# Patient Record
Sex: FEMALE | Race: WHITE | NOT HISPANIC OR LATINO | Employment: OTHER | URBAN - NONMETROPOLITAN AREA
[De-identification: names, ages, dates, MRNs, and addresses within clinical notes are randomized per-mention and may not be internally consistent; named-entity substitution may affect disease eponyms.]

---

## 2017-01-05 ENCOUNTER — CLINICAL SUPPORT (OUTPATIENT)
Dept: CARDIOLOGY | Facility: CLINIC | Age: 82
End: 2017-01-05

## 2017-01-05 VITALS
OXYGEN SATURATION: 95 % | SYSTOLIC BLOOD PRESSURE: 179 MMHG | DIASTOLIC BLOOD PRESSURE: 71 MMHG | HEIGHT: 64 IN | BODY MASS INDEX: 24.11 KG/M2 | HEART RATE: 68 BPM | WEIGHT: 141.2 LBS

## 2017-01-05 DIAGNOSIS — I10 ESSENTIAL HYPERTENSION: Primary | ICD-10-CM

## 2017-01-05 DIAGNOSIS — I25.10 CORONARY ARTERY DISEASE DUE TO CALCIFIED CORONARY LESION: ICD-10-CM

## 2017-01-05 DIAGNOSIS — I25.84 CORONARY ARTERY DISEASE DUE TO CALCIFIED CORONARY LESION: ICD-10-CM

## 2017-01-05 PROCEDURE — 99211 OFF/OP EST MAY X REQ PHY/QHP: CPT | Performed by: NURSE PRACTITIONER

## 2017-01-05 RX ORDER — LOSARTAN POTASSIUM 100 MG/1
100 TABLET ORAL DAILY
Qty: 30 TABLET | Refills: 5 | Status: SHIPPED | OUTPATIENT
Start: 2017-01-05 | End: 2017-02-02

## 2017-01-05 NOTE — PROGRESS NOTES
Cici Winter  8/18/1925 1/5/2017   ?   Chief Complaint   Patient presents with   • Follow-up     htn      ?   HPI:   ?   ? Patient here for b/p check / HTN follow-up. B/P readings brought to office for last 2 weeks. States had tried taking the clonidine tid but she stopped taking it because it didn't help at all. Denies any chest pain, shortness of breath etc. She stated she had a bowel movement and the Nifedipine tablet she takes came out in her stool un-dissolved, so she was concerns about this. Will discuss above with Pattie Hess NP and  Proceed accordingly. PH,LPN  ?     Current Outpatient Prescriptions:   •  aspirin 81 MG tablet, Take  by mouth daily., Disp: , Rfl:   •  carvedilol (COREG) 6.25 MG tablet, Take  by mouth 2 (two) times a day., Disp: , Rfl:   •  CloNIDine (CATAPRES) 0.1 MG tablet, Take 1 tablet by mouth 3 (Three) Times a Day As Needed for high blood pressure (SBP > 160 or DBP . 90)., Disp: 40 tablet, Rfl: 3  •  dipyridamole (PERSANTINE) 75 MG tablet, Take  by mouth., Disp: , Rfl:   •  furosemide (LASIX) 40 MG tablet, Take 40 mg by mouth Daily As Needed (edema)., Disp: , Rfl:   •  isosorbide dinitrate (ISORDIL) 10 MG tablet, Take  by mouth every 8 (eight) hours., Disp: , Rfl:   •  KRILL OIL PO, Take  by mouth daily., Disp: , Rfl:   •  methimazole (TAPAZOLE) 5 MG tablet, Take 0.5 tablets by mouth daily., Disp: , Rfl:   •  NIFEdipine CC (ADALAT CC) 60 MG 24 hr tablet, Take  by mouth daily., Disp: , Rfl:   •  nitroglycerin (NITROSTAT) 0.4 MG SL tablet, Place  under the tongue., Disp: , Rfl:   •  potassium chloride (MICRO-K) 10 MEQ CR capsule, Take 10 mEq by mouth Daily As Needed (with lasix)., Disp: , Rfl:   •  pravastatin (PRAVACHOL) 40 MG tablet, Take  by mouth every other day., Disp: , Rfl:   •  terazosin (HYTRIN) 2 MG capsule, TAKE 1 CAPSULE DAILY, Disp: 30 capsule, Rfl: 5  •  VITAMIN B1-B12 IJ, Inject  as directed. Monthly, Disp: , Rfl:   •  vitamin D (ERGOCALCIFEROL) 06442 UNITS capsule  capsule, Take  by mouth 1 (one) time per week., Disp: , Rfl:   •  losartan (COZAAR) 100 MG tablet, Take 1 tablet by mouth Daily., Disp: 30 tablet, Rfl: 5    Current Facility-Administered Medications:   •  CloNIDine (CATAPRES) tablet 0.1 mg, 0.1 mg, Oral, Once, DEBBY Woody   ?   ?   Promethazine       Procedures     ?   Assessment/Plan    ? Patient will increase cozaar to 100 mg.  She will get a BMP in 1 week.  She will call next week with her BP readings.  She will get an appt at next avail for follow-up.    ?   ?

## 2017-01-05 NOTE — MR AVS SNAPSHOT
Cici Winter   1/5/2017 9:00 AM   Clinical Support    Dept Phone:  [phone]   Encounter #:  [id number]    Provider:  NURSE CARD SMRST MONET   Department:  [facility] CARDIOLOGY                Your Full Care Plan              Today's Medication Changes          These changes are accurate as of: 1/5/17  9:20 AM.  If you have any questions, ask your nurse or doctor.               Medication(s)that have changed:     losartan 100 MG tablet   Commonly known as:  COZAAR   Take 1 tablet by mouth Daily.   What changed:    - medication strength  - how much to take            Where to Get Your Medications      These medications were sent to MEDICINE [facility] - [place], [place] - [address] - [phone]  - [phone]   [address], [facility] [place] [address]     Phone:  [phone]     losartan 100 MG tablet                  Your Updated Medication List          This list is accurate as of: 1/5/17  9:20 AM.  Always use your most recent med list.                aspirin 81 MG tablet       carvedilol 6.25 MG tablet   Commonly known as:  COREG       CloNIDine 0.1 MG tablet   Commonly known as:  CATAPRES   Take 1 tablet by mouth 3 (Three) Times a Day As Needed for high blood pressure (SBP > 160 or DBP . 90).       furosemide 40 MG tablet   Commonly known as:  LASIX       isosorbide dinitrate 10 MG tablet   Commonly known as:  ISORDIL       KRILL OIL PO       losartan 100 MG tablet   Commonly known as:  COZAAR   Take 1 tablet by mouth Daily.       methIMAzole 5 MG tablet   Commonly known as:  TAPAZOLE       NIFEdipine CC 60 MG 24 hr tablet   Commonly known as:  ADALAT CC       NITROSTAT 0.4 MG SL tablet   Generic drug:  nitroglycerin       PERSANTINE 75 MG tablet   Generic drug:  dipyridamole       potassium chloride 10 MEQ CR capsule   Commonly known as:  MICRO-K       pravastatin 40 MG tablet   Commonly known as:  PRAVACHOL       terazosin 2 MG  "capsule   Commonly known as:  HYTRIN   TAKE 1 CAPSULE DAILY       VITAMIN B1-B12 IJ       vitamin D 84440 UNITS capsule capsule   Commonly known as:  ERGOCALCIFEROL               You Were Diagnosed With        Codes Comments    Essential hypertension    -  Primary ICD-10-CM: I10  ICD-9-CM: 401.9     Coronary artery disease due to calcified coronary lesion     ICD-10-CM: I25.10, I25.84  ICD-9-CM: 414.00, 414.4       Medications to be Given to You by a Medical Professional     Due       Frequency    12/22/2016 CloNIDine (CATAPRES) tablet 0.1 mg  Once      Instructions     None    Patient Instructions History      Upcoming Appointments     Visit Type Date Time Department    NURSE/MA VISIT 1/5/2017  9:00 AM [facility]ALY      [employer] Signup     Our records indicate that you have declined [employer] signup. If you would like to sign up for [employer], please email [email or web address] or call [phone] to obtain an activation code.             Other Info from Your Visit           Allergies     Promethazine        Reason for Visit     Follow-up htn      Vital Signs     Blood Pressure Pulse Height Weight Oxygen Saturation Body Mass Index    179/71 (BP Location: Left arm, Patient Position: Sitting) 68 64\" (162.6 cm) 141 lb 3.2 oz (64 kg) 95% 24.24 kg/m2    Smoking Status                   Former Smoker           Problems and Diagnoses Noted     High blood pressure    Coronary artery disease due to calcified coronary lesion            "

## 2017-01-10 ENCOUNTER — TELEPHONE (OUTPATIENT)
Dept: CARDIOLOGY | Facility: CLINIC | Age: 82
End: 2017-01-10

## 2017-01-10 NOTE — TELEPHONE ENCOUNTER
----- Message from Luci Tracy sent at 1/10/2017  1:01 PM EST -----  Pt calling to report BP readings:    180/60 Friday  180/60 Saturday  180/64 Sunday  169/60 Monday  160/56 Tuesday

## 2017-01-10 NOTE — TELEPHONE ENCOUNTER
B/P readings noted per Pattie Hess Np and verbal orders received to give Losartan another week and if b/p's still running 160's systolic to call our office back. Patient aware, verbalized ok. PH,LPN

## 2017-01-16 ENCOUNTER — TELEPHONE (OUTPATIENT)
Dept: CARDIOLOGY | Facility: CLINIC | Age: 82
End: 2017-01-16

## 2017-01-16 NOTE — TELEPHONE ENCOUNTER
----- Message from Luci Rossana sent at 1/16/2017  9:03 AM EST -----  Pt calling regarding her bp.    It's 197/64 currently and has been up since she last called.     Patient requests to be seen for evaluation of BP. She will be here in AM for Nurse Visit for BP Check.

## 2017-01-17 ENCOUNTER — CLINICAL SUPPORT (OUTPATIENT)
Dept: CARDIOLOGY | Facility: CLINIC | Age: 82
End: 2017-01-17

## 2017-01-17 VITALS
HEART RATE: 65 BPM | DIASTOLIC BLOOD PRESSURE: 60 MMHG | HEIGHT: 64 IN | WEIGHT: 137 LBS | BODY MASS INDEX: 23.39 KG/M2 | OXYGEN SATURATION: 96 % | SYSTOLIC BLOOD PRESSURE: 116 MMHG

## 2017-01-17 DIAGNOSIS — I10 ESSENTIAL HYPERTENSION: Primary | ICD-10-CM

## 2017-01-17 PROCEDURE — 99211 OFF/OP EST MAY X REQ PHY/QHP: CPT | Performed by: NURSE PRACTITIONER

## 2017-01-17 RX ORDER — CARVEDILOL 6.25 MG/1
TABLET ORAL
Qty: 45 TABLET | Refills: 5 | Status: SHIPPED | OUTPATIENT
Start: 2017-01-17 | End: 2017-10-10 | Stop reason: SDUPTHER

## 2017-01-17 NOTE — PROGRESS NOTES
Cici Winter  8/18/1925 1/17/2017   ?   Chief Complaint   Patient presents with   • Hypertension     nurse visit      ?   HPI:   ?   ? Patient here for HTN and b/p check. States was seen at [facility] on Friday for HTN and coreg 6.25 mg was increased to tid dosing. B/p's brought from home have still been running 160-190 systolic before meds, has used prn clonidine almost once daily, but unsure of what h/r has been running has not documented them. Patient has not been taking b/p after meds taken. Patient was at [employer] on Sunday and when she got up stated felt like blood rushed up to her head and she had to sit down and rest her head and it got better. f Has had a couple of 130 systolic readings also. Will discuss above with Pattie Hess NP.  ?     Current Outpatient Prescriptions:   •  aspirin 81 MG tablet, Take  by mouth daily., Disp: , Rfl:   •  carvedilol (COREG) 6.25 MG tablet, Take  by mouth 3 (Three) Times a Day., Disp: , Rfl:   •  CloNIDine (CATAPRES) 0.1 MG tablet, Take 1 tablet by mouth 3 (Three) Times a Day As Needed for high blood pressure (SBP > 160 or DBP . 90)., Disp: 40 tablet, Rfl: 3  •  dipyridamole (PERSANTINE) 75 MG tablet, Take  by mouth., Disp: , Rfl:   •  furosemide (LASIX) 40 MG tablet, Take 40 mg by mouth Daily As Needed (edema)., Disp: , Rfl:   •  isosorbide dinitrate (ISORDIL) 10 MG tablet, Take  by mouth every 8 (eight) hours., Disp: , Rfl:   •  KRILL OIL PO, Take  by mouth daily., Disp: , Rfl:   •  losartan (COZAAR) 100 MG tablet, Take 1 tablet by mouth Daily., Disp: 30 tablet, Rfl: 5  •  methimazole (TAPAZOLE) 5 MG tablet, Take 0.5 tablets by mouth daily., Disp: , Rfl:   •  NIFEdipine CC (ADALAT CC) 60 MG 24 hr tablet, Take  by mouth daily., Disp: , Rfl:   •  potassium chloride (MICRO-K) 10 MEQ CR capsule, Take 10 mEq by mouth Daily As Needed (with lasix)., Disp: , Rfl:   •  pravastatin (PRAVACHOL) 40 MG tablet, Take  by mouth every other day., Disp: , Rfl:   •  terazosin (HYTRIN) 2 MG  capsule, TAKE 1 CAPSULE DAILY, Disp: 30 capsule, Rfl: 5  •  VITAMIN B1-B12 IJ, Inject  as directed. Monthly, Disp: , Rfl:   •  vitamin D (ERGOCALCIFEROL) 23073 UNITS capsule capsule, Take  by mouth 1 (one) time per week., Disp: , Rfl:   •  nitroglycerin (NITROSTAT) 0.4 MG SL tablet, Place  under the tongue., Disp: , Rfl:     Current Facility-Administered Medications:   •  CloNIDine (CATAPRES) tablet 0.1 mg, 0.1 mg, Oral, Once, DEBBY Woody   ?   ?   Promethazine       Procedures     ?   Assessment/Plan    ?   ?   ? 1. HTN    Verbal orders per Pattie Hess NP to change coreg to 6.25 mg in am and 12.5 mg in pm, monitor b/p and h/r before meds taken and after meds taken, call office in a week with readings or sooner if needed. Patient and female  aware of above and verbalized they understood. PH,LPN

## 2017-01-17 NOTE — MR AVS SNAPSHOT
Cici Winter   1/17/2017 9:30 AM   Clinical Support    Dept Phone:  [phone]   Encounter #:  [id number]    Provider:  NURSE CARD SMRST MONET   Department:  [facility] CARDIOLOGY                Your Full Care Plan              Today's Medication Changes          These changes are accurate as of: 1/17/17 10:06 AM.  If you have any questions, ask your nurse or doctor.               Medication(s)that have changed:     carvedilol 6.25 MG tablet   Commonly known as:  COREG   TAKE 6.25 MG in PM and 12.5 mg in PM.   What changed:    - how to take this  - when to take this  - additional instructions            Where to Get Your Medications      These medications were sent to [facility] [place], [place] - [address] - [phone]  - [phone] FX  [address], [place] [address]     Phone:  [phone]     carvedilol 6.25 MG tablet                  Your Updated Medication List          This list is accurate as of: 1/17/17 10:06 AM.  Always use your most recent med list.                aspirin 81 MG tablet       carvedilol 6.25 MG tablet   Commonly known as:  COREG   TAKE 6.25 MG in PM and 12.5 mg in PM.       CloNIDine 0.1 MG tablet   Commonly known as:  CATAPRES   Take 1 tablet by mouth 3 (Three) Times a Day As Needed for high blood pressure (SBP > 160 or DBP . 90).       furosemide 40 MG tablet   Commonly known as:  LASIX       isosorbide dinitrate 10 MG tablet   Commonly known as:  ISORDIL       KRILL OIL PO       losartan 100 MG tablet   Commonly known as:  COZAAR   Take 1 tablet by mouth Daily.       methIMAzole 5 MG tablet   Commonly known as:  TAPAZOLE       NIFEdipine CC 60 MG 24 hr tablet   Commonly known as:  ADALAT CC       NITROSTAT 0.4 MG SL tablet   Generic drug:  nitroglycerin       PERSANTINE 75 MG tablet   Generic drug:  dipyridamole       potassium chloride 10 MEQ CR capsule   Commonly known as:  MICRO-K    "   pravastatin 40 MG tablet   Commonly known as:  PRAVACHOL       terazosin 2 MG capsule   Commonly known as:  HYTRIN   TAKE 1 CAPSULE DAILY       VITAMIN B1-B12 IJ       vitamin D 94425 UNITS capsule capsule   Commonly known as:  ERGOCALCIFEROL               You Were Diagnosed With        Codes Comments    Essential hypertension    -  Primary ICD-10-CM: I10  ICD-9-CM: 401.9       Medications to be Given to You by a Medical Professional     Due       Frequency    12/22/2016 CloNIDine (CATAPRES) tablet 0.1 mg  Once      Instructions     None    Patient Instructions History      Upcoming Appointments     Visit Type Date Time Department    NURSE/MA VISIT 1/17/2017  9:30 AM [facility]    OFFICE VISIT 1/31/2017  8:45 AM [facility] HEALY      MyChart Signup     Our records indicate that you have declined [facility] signup. If you would like to sign up for [employer], please email [email or web address] or call [phone] to obtain an activation code.             Other Info from Your Visit           Your Appointments     Jan 31, 2017  8:45 AM EST   Office Visit with DEBBY Woody   [facility] (--)    [address] [place] [id number] [phone]           Arrive 15 minutes prior to appointment.              Allergies     Promethazine        Reason for Visit     Hypertension nurse visit      Vital Signs     Blood Pressure Pulse Height Weight Oxygen Saturation Body Mass Index    116/60 (BP Location: Left arm, Patient Position: Sitting) 65 64\" (162.6 cm) 137 lb (62.1 kg) 96% 23.52 kg/m2    Smoking Status                   Former Smoker           Problems and Diagnoses Noted     High blood pressure        "

## 2017-01-23 RX ORDER — TERAZOSIN 2 MG/1
CAPSULE ORAL
Qty: 30 CAPSULE | Refills: 5 | Status: SHIPPED | OUTPATIENT
Start: 2017-01-23 | End: 2017-06-12 | Stop reason: SDUPTHER

## 2017-01-24 ENCOUNTER — TELEPHONE (OUTPATIENT)
Dept: CARDIOLOGY | Facility: CLINIC | Age: 82
End: 2017-01-24

## 2017-01-24 DIAGNOSIS — I10 ESSENTIAL HYPERTENSION: ICD-10-CM

## 2017-01-24 RX ORDER — CLONIDINE HYDROCHLORIDE 0.1 MG/1
0.1 TABLET ORAL 2 TIMES DAILY
Qty: 60 TABLET | Refills: 5 | Status: SHIPPED | OUTPATIENT
Start: 2017-01-24 | End: 2017-01-31

## 2017-01-31 ENCOUNTER — OFFICE VISIT (OUTPATIENT)
Dept: CARDIOLOGY | Facility: CLINIC | Age: 82
End: 2017-01-31

## 2017-01-31 VITALS
DIASTOLIC BLOOD PRESSURE: 60 MMHG | OXYGEN SATURATION: 94 % | WEIGHT: 140.2 LBS | HEART RATE: 57 BPM | SYSTOLIC BLOOD PRESSURE: 166 MMHG | HEIGHT: 64 IN | BODY MASS INDEX: 23.93 KG/M2

## 2017-01-31 DIAGNOSIS — I25.10 MULTIPLE VESSEL CORONARY ARTERY DISEASE: ICD-10-CM

## 2017-01-31 DIAGNOSIS — I73.9 PERIPHERAL VASCULAR DISEASE (HCC): Primary | ICD-10-CM

## 2017-01-31 DIAGNOSIS — Z00.00 HEALTHCARE MAINTENANCE: ICD-10-CM

## 2017-01-31 DIAGNOSIS — I10 ESSENTIAL HYPERTENSION: ICD-10-CM

## 2017-01-31 DIAGNOSIS — R09.89 BILATERAL CAROTID BRUITS: ICD-10-CM

## 2017-01-31 DIAGNOSIS — E78.00 HYPERCHOLESTEROLEMIA: ICD-10-CM

## 2017-01-31 PROCEDURE — 99213 OFFICE O/P EST LOW 20 MIN: CPT | Performed by: NURSE PRACTITIONER

## 2017-01-31 RX ORDER — NIFEDIPINE 90 MG/1
90 TABLET, FILM COATED, EXTENDED RELEASE ORAL DAILY
Qty: 30 TABLET | Refills: 5 | Status: SHIPPED | OUTPATIENT
Start: 2017-01-31 | End: 2017-06-12

## 2017-01-31 RX ORDER — NITROGLYCERIN 0.4 MG/1
0.4 TABLET SUBLINGUAL
Status: DISCONTINUED | OUTPATIENT
Start: 2017-01-31 | End: 2017-03-09

## 2017-01-31 RX ADMIN — NITROGLYCERIN 0.4 MG: 0.4 TABLET SUBLINGUAL at 09:02

## 2017-01-31 NOTE — PROGRESS NOTES
Subjective   Cici Winter is a 89 y.o. female     Chief Complaint   Patient presents with   • Follow-up     presents as a follow up   • Hypertension       HPI    Problem List:    1. Coronary artery disease with history of bypass, remote in 97 by Dr. Hayden.  1.1 Recent catheterization, April 2014 with angioplasty and stent to SVG of left circumflex and drug-eluting stent to SVG of RCA and posterolateral branch by Dr. Siu at [facility].  2. Ischemic cardiomyopathy, EF of 30-35% by echo, March 2014.  1.2 Stress test 9/16/15 - mild lateral and posterolateral ischemia   2.1 Follow-up echocardiogram, April 2014 indicated EF of 55-60%.  2.2 Echo 9/16/15 - Mild LVH; EF > 65%; DD I; mild AR, MR and TR; Mod IL PA 35-40  3. Hypertension  4. Dyslipidemia  5. Preserved systolic function    Patient is a 89-year-old female who presents today for a follow-up with her daughter at her side.  She has not taken her medication yet this AM.  She denies any chest pain, pressure, palpitations, fluttering, presyncope, syncope, orthopnea or PND. She will have dizziness, but not very often.  She will get some edema.  She will only get short of breath when she climbs stairs.  She brought her BP and it has been running high still.  She has taken it multiple times in a row, I advised her against this.       Current Outpatient Prescriptions   Medication Sig Dispense Refill   • aspirin 81 MG tablet Take  by mouth daily.     • carvedilol (COREG) 6.25 MG tablet TAKE 6.25 MG in PM and 12.5 mg in PM. 45 tablet 5   • dipyridamole (PERSANTINE) 75 MG tablet Take  by mouth.     • furosemide (LASIX) 40 MG tablet Take 40 mg by mouth Daily As Needed (edema).     • isosorbide dinitrate (ISORDIL) 10 MG tablet Take  by mouth every 8 (eight) hours.     • KRILL OIL PO Take  by mouth daily.     • losartan (COZAAR) 100 MG tablet Take 1 tablet by mouth Daily. 30 tablet 5   • methimazole (TAPAZOLE) 5 MG tablet Take 0.5 tablets by mouth daily.     •  NIFEdipine CC (ADALAT CC) 90 MG 24 hr tablet Take 1 tablet by mouth Daily. 30 tablet 5   • nitroglycerin (NITROSTAT) 0.4 MG SL tablet Place  under the tongue.     • potassium chloride (MICRO-K) 10 MEQ CR capsule Take 10 mEq by mouth Daily As Needed (with lasix).     • pravastatin (PRAVACHOL) 40 MG tablet Take  by mouth every other day.     • terazosin (HYTRIN) 2 MG capsule TAKE 1 CAPSULE DAILY 30 capsule 5   • VITAMIN B1-B12 IJ Inject  as directed. Monthly     • vitamin D (ERGOCALCIFEROL) 00293 UNITS capsule capsule Take  by mouth 1 (one) time per week.     • CloNIDine (CATAPRES-TTS) 0.1 MG/24HR patch Place 1 patch on the skin 1 (One) Time Per Week. 5 patch 6     Current Facility-Administered Medications   Medication Dose Route Frequency Provider Last Rate Last Dose   • CloNIDine (CATAPRES) tablet 0.1 mg  0.1 mg Oral Once Pattie Hess APRN       • nitroglycerin (NITROSTAT) SL tablet 0.4 mg  0.4 mg Sublingual Q5 Min PRN Pattie Hess APRN   0.4 mg at 01/31/17 0902       ALLERGIES    Promethazine    Past Medical History   Diagnosis Date   • Carotid bruit    • Coronary artery disease    • Hyperlipidemia    • Hypertension    • Ischemic cardiomyopathy    • Leg pain      with exercise (leg claudication)   • PVD (peripheral vascular disease)    • Sinus bradycardia    • SOB (shortness of breath)        Social History     Social History   • Marital status:      Spouse name: N/A   • Number of children: N/A   • Years of education: N/A     Occupational History   • Not on file.     Social History Main Topics   • Smoking status: Former Smoker     Types: Cigarettes     Quit date: 6/29/1976   • Smokeless tobacco: Never Used   • Alcohol use No   • Drug use: No   • Sexual activity: Not on file     Other Topics Concern   • Not on file     Social History Narrative       Family History   Problem Relation Age of Onset   • Diabetes Mother    • Other Brother      CABG   • Hypertension Brother        Review of Systems  "  Constitutional: Positive for fatigue. Negative for diaphoresis.   HENT: Positive for rhinorrhea and sneezing.    Eyes: Positive for visual disturbance (reading glasses only ).   Respiratory: Positive for shortness of breath (when climb up stairs only ). Negative for chest tightness.    Cardiovascular: Positive for leg swelling (occasional edema, more in the right ). Negative for chest pain and palpitations.   Gastrointestinal: Negative for nausea and vomiting.   Endocrine: Negative.    Genitourinary: Positive for difficulty urinating (sometime has to go 2 x to empty her bladder ).   Musculoskeletal: Positive for arthralgias, back pain and neck pain.        Occasional leg cramps    Skin: Negative.    Allergic/Immunologic: Negative.    Neurological: Positive for dizziness (not very often ). Negative for syncope and light-headedness.   Hematological: Bruises/bleeds easily.   Psychiatric/Behavioral: The patient is nervous/anxious.        Objective   Visit Vitals   • BP (!) 202/58 (BP Location: Left arm, Patient Position: Sitting)   • Pulse 57   • Ht 64\" (162.6 cm)   • Wt 140 lb 3.2 oz (63.6 kg)   • SpO2 94%   • BMI 24.07 kg/m2     Lab Results (most recent)     None        Physical Exam   Constitutional: She is oriented to person, place, and time. She appears well-developed and well-nourished. She is active and cooperative.   HENT:   Head: Normocephalic.   Eyes: Lids are normal.   Glasses PRN    Neck: Normal carotid pulses, no hepatojugular reflux and no JVD present. Carotid bruit is not present.   Cardiovascular: Regular rhythm and normal heart sounds.  Bradycardia present.    Pulses:       Radial pulses are 2+ on the right side, and 2+ on the left side.        Dorsalis pedis pulses are 2+ on the right side, and 2+ on the left side.        Posterior tibial pulses are 2+ on the right side, and 2+ on the left side.   LLE trace edema & 1+ edema RLE.    Pulmonary/Chest: Effort normal. She has rales (faint ) in the right " lower field and the left lower field.   Abdominal: Normal appearance.   Neurological: She is alert and oriented to person, place, and time.   Skin: Skin is warm, dry and intact.   Psychiatric: She has a normal mood and affect. Her speech is normal and behavior is normal. Judgment and thought content normal. Cognition and memory are normal.       Procedure   Procedures         Assessment/Plan      Diagnosis Plan   1. Peripheral vascular disease     2. Multiple vessel coronary artery disease     3. Essential hypertension  nitroglycerin (NITROSTAT) SL tablet 0.4 mg    US renal bilateral    Basic Metabolic Panel    NIFEdipine CC (ADALAT CC) 90 MG 24 hr tablet    CloNIDine (CATAPRES-TTS) 0.1 MG/24HR patch   4. Hypercholesterolemia     5. Bilateral carotid bruits     6. Healthcare maintenance  Basic Metabolic Panel       Return in about 2 weeks (around 2/14/2017), or Nurse Visit 2 weeks BP check .       Patient will stop clonidine 0.1 BID and start Catapres Patch.  She will increase her Nifedipine to 90.  She will continue her medication regimen otherwise.  She will get a BMP.  She will get a renal U/S.  She will follow-up in 2 weeks for a nurse visit.  She will monitor her BP before meds and 1 1/2 hrs later twice a day.

## 2017-01-31 NOTE — MR AVS SNAPSHOT
Cici Winter   1/31/2017 8:45 AM   Office Visit    Dept Phone:  [phone]   Encounter #:  [id number]    Provider:  DEBBY Woody   Department:  [facility] CARDIOLOGY                Your Full Care Plan              Today's Medication Changes          These changes are accurate as of: 1/31/17  9:57 AM.  If you have any questions, ask your nurse or doctor.               New Medication(s)Ordered:     CloNIDine 0.1 MG/24HR patch   Commonly known as:  CATAPRES-TTS   Place 1 patch on the skin 1 (One) Time Per Week.   Replaces:  CloNIDine 0.1 MG tablet   Started by:  DEBBY Woody         Medication(s)that have changed:     NIFEdipine CC 90 MG 24 hr tablet   Commonly known as:  ADALAT CC   Take 1 tablet by mouth Daily.   What changed:    - medication strength  - how much to take   Changed by:  DEBBY Woody         Stop taking medication(s)listed here:     CloNIDine 0.1 MG tablet   Commonly known as:  CATAPRES   Replaced by:  CloNIDine 0.1 MG/24HR patch   Stopped by:  DEBBY Woody                Where to Get Your Medications      These medications were sent to [facility] - [place], [place] - [address] - [phone]  - [phone]   [address], [facility] [place] [address]     Phone:  [phone]     CloNIDine 0.1 MG/24HR patch    NIFEdipine CC 90 MG 24 hr tablet                  Your Updated Medication List          This list is accurate as of: 1/31/17  9:57 AM.  Always use your most recent med list.                aspirin 81 MG tablet       carvedilol 6.25 MG tablet   Commonly known as:  COREG   TAKE 6.25 MG in PM and 12.5 mg in PM.       CloNIDine 0.1 MG/24HR patch   Commonly known as:  CATAPRES-TTS   Place 1 patch on the skin 1 (One) Time Per Week.       furosemide 40 MG tablet   Commonly known as:  LASIX       isosorbide dinitrate 10 MG tablet   Commonly known as:  ISORDIL       KRILL OIL PO       losartan 100 MG tablet   Commonly known as:  COZAAR   Take 1 tablet by mouth Daily.       methIMAzole 5 MG tablet   Commonly known as:  TAPAZOLE       NIFEdipine CC 90 MG 24 hr tablet   Commonly known as:  ADALAT CC   Take 1 tablet by mouth Daily.       NITROSTAT 0.4 MG SL tablet   Generic drug:  nitroglycerin       PERSANTINE 75 MG tablet   Generic drug:  dipyridamole       potassium chloride 10 MEQ CR capsule   Commonly known as:  MICRO-K       pravastatin 40 MG tablet   Commonly known as:  PRAVACHOL       terazosin 2 MG capsule   Commonly known as:  HYTRIN   TAKE 1 CAPSULE DAILY       VITAMIN B1-B12 IJ       vitamin D 88549 UNITS capsule capsule   Commonly known as:  ERGOCALCIFEROL               You Were Diagnosed With        Codes Comments    Peripheral vascular disease    -  Primary ICD-10-CM: I73.9  ICD-9-CM: 443.9     Multiple vessel coronary artery disease     ICD-10-CM: I25.10  ICD-9-CM: 414.00     Essential hypertension     ICD-10-CM: I10  ICD-9-CM: 401.9     Hypercholesterolemia     ICD-10-CM: E78.00  ICD-9-CM: 272.0     Bilateral carotid bruits     ICD-10-CM: R09.89  ICD-9-CM: 785.9     Healthcare maintenance     ICD-10-CM: Z00.00  ICD-9-CM: V70.0       Medications to be Given to You by a Medical Professional     Due       Frequency    12/22/2016 CloNIDine (CATAPRES) tablet 0.1 mg  Once    (none) nitroglycerin (NITROSTAT) SL tablet 0.4 mg  Every 5 Minutes PRN      Instructions    Edema  Edema is an abnormal buildup of fluids in your body tissues. Edema is somewhat dependent on gravity to pull the fluid to the lowest place in your body. That makes the condition more common in the legs and thighs (lower extremities). Painless swelling of the feet and ankles is common and becomes more likely as you get older. It is also common in looser tissues, like around your eyes.   When the affected area is squeezed, the fluid may move out of that spot and leave a dent for a few moments. This dent is called pitting.    CAUSES   There are many possible causes of edema. Eating too much salt and being on your feet or sitting for a long time can cause edema in your legs and ankles. Hot weather may make edema worse. Common medical causes of edema include:  · Heart failure.  · Liver disease.  · Kidney disease.  · Weak blood vessels in your legs.  · Cancer.  · An injury.  · Pregnancy.  · Some medications.  · Obesity.   SYMPTOMS   Edema is usually painless. Your skin may look swollen or shiny.   DIAGNOSIS   Your health care provider may be able to diagnose edema by asking about your medical history and doing a physical exam. You may need to have tests such as X-rays, an electrocardiogram, or blood tests to check for medical conditions that may cause edema.   TREATMENT   Edema treatment depends on the cause. If you have heart, liver, or kidney disease, you need the treatment appropriate for these conditions. General treatment may include:  · Elevation of the affected body part above the level of your heart.  · Compression of the affected body part. Pressure from elastic bandages or support stockings squeezes the tissues and forces fluid back into the blood vessels. This keeps fluid from entering the tissues.  · Restriction of fluid and salt intake.  · Use of a water pill (diuretic). These medications are appropriate only for some types of edema. They pull fluid out of your body and make you urinate more often. This gets rid of fluid and reduces swelling, but diuretics can have side effects. Only use diuretics as directed by your health care provider.  HOME CARE INSTRUCTIONS   · Keep the affected body part above the level of your heart when you are lying down.    · Do not sit still or stand for prolonged periods.    · Do not put anything directly under your knees when lying down.  · Do not wear constricting clothing or garters on your upper legs.    · Exercise your legs to work the fluid back into your blood vessels. This may help the  swelling go down.    · Wear elastic bandages or support stockings to reduce ankle swelling as directed by your health care provider.    · Eat a low-salt diet to reduce fluid if your health care provider recommends it.    · Only take medicines as directed by your health care provider.   SEEK MEDICAL CARE IF:   · Your edema is not responding to treatment.  · You have heart, liver, or kidney disease and notice symptoms of edema.  · You have edema in your legs that does not improve after elevating them.    · You have sudden and unexplained weight gain.  SEEK IMMEDIATE MEDICAL CARE IF:   · You develop shortness of breath or chest pain.    · You cannot breathe when you lie down.  · You develop pain, redness, or warmth in the swollen areas.    · You have heart, liver, or kidney disease and suddenly get edema.  · You have a fever and your symptoms suddenly get worse.  MAKE SURE YOU:   · Understand these instructions.  · Will watch your condition.  · Will get help right away if you are not doing well or get worse.     This information is not intended to replace advice given to you by your health care provider. Make sure you discuss any questions you have with your health care provider.     Document Released: 12/18/2006 Document Revised: 01/08/2016 Document Reviewed: 10/10/2014  [employer] Interactive Patient Education ©2016 [employer]  Hypertension  Hypertension, commonly called high blood pressure, is when the force of blood pumping through your arteries is too strong. Your arteries are the blood vessels that carry blood from your heart throughout your body. A blood pressure reading consists of a higher number over a lower number, such as 110/72. The higher number (systolic) is the pressure inside your arteries when your heart pumps. The lower number (diastolic) is the pressure inside your arteries when your heart relaxes. Ideally you want your blood pressure below 120/80.  Hypertension forces your heart to work harder to  pump blood. Your arteries may become narrow or stiff. Having untreated or uncontrolled hypertension can cause heart attack, stroke, kidney disease, and other problems.  RISK FACTORS  Some risk factors for high blood pressure are controllable. Others are not.   Risk factors you cannot control include:   · Race. You may be at higher risk if you are .  · Age. Risk increases with age.  · Gender. Men are at higher risk than women before age 45 years. After age 65, women are at higher risk than men.  Risk factors you can control include:  · Not getting enough exercise or physical activity.  · Being overweight.  · Getting too much fat, sugar, calories, or salt in your diet.  · Drinking too much alcohol.  SIGNS AND SYMPTOMS  Hypertension does not usually cause signs or symptoms. Extremely high blood pressure (hypertensive crisis) may cause headache, anxiety, shortness of breath, and nosebleed.  DIAGNOSIS  To check if you have hypertension, your health care provider will measure your blood pressure while you are seated, with your arm held at the level of your heart. It should be measured at least twice using the same arm. Certain conditions can cause a difference in blood pressure between your right and left arms. A blood pressure reading that is higher than normal on one occasion does not mean that you need treatment. If it is not clear whether you have high blood pressure, you may be asked to return on a different day to have your blood pressure checked again. Or, you may be asked to monitor your blood pressure at home for 1 or more weeks.  TREATMENT  Treating high blood pressure includes making lifestyle changes and possibly taking medicine. Living a healthy lifestyle can help lower high blood pressure. You may need to change some of your habits.  Lifestyle changes may include:  · Following the DASH diet. This diet is high in fruits, vegetables, and whole grains. It is low in salt, red meat, and added  sugars.  · Keep your sodium intake below 2,300 mg per day.  · Getting at least 30-45 minutes of aerobic exercise at least 4 times per week.  · Losing weight if necessary.  · Not smoking.  · Limiting alcoholic beverages.  · Learning ways to reduce stress.  Your health care provider may prescribe medicine if lifestyle changes are not enough to get your blood pressure under control, and if one of the following is true:  · You are 18-59 years of age and your systolic blood pressure is above 140.  · You are 60 years of age or older, and your systolic blood pressure is above 150.  · Your diastolic blood pressure is above 90.  · You have diabetes, and your systolic blood pressure is over 140 or your diastolic blood pressure is over 90.  · You have kidney disease and your blood pressure is above 140/90.  · You have heart disease and your blood pressure is above 140/90.  Your personal target blood pressure may vary depending on your medical conditions, your age, and other factors.  HOME CARE INSTRUCTIONS  · Have your blood pressure rechecked as directed by your health care provider.    · Take medicines only as directed by your health care provider. Follow the directions carefully. Blood pressure medicines must be taken as prescribed. The medicine does not work as well when you skip doses. Skipping doses also puts you at risk for problems.  · Do not smoke.    · Monitor your blood pressure at home as directed by your health care provider.   SEEK MEDICAL CARE IF:   · You think you are having a reaction to medicines taken.  · You have recurrent headaches or feel dizzy.  · You have swelling in your ankles.  · You have trouble with your vision.  SEEK IMMEDIATE MEDICAL CARE IF:  · You develop a severe headache or confusion.  · You have unusual weakness, numbness, or feel faint.  · You have severe chest or abdominal pain.  · You vomit repeatedly.  · You have trouble breathing.  MAKE SURE YOU:   · Understand these  "instructions.  · Will watch your condition.  · Will get help right away if you are not doing well or get worse.     This information is not intended to replace advice given to you by your health care provider. Make sure you discuss any questions you have with your health care provider.     Document Released: 12/18/2006 Document Revised: 05/03/2016 Document Reviewed: 10/10/2014  [employer] Interactive Patient Education ©2016 [employer]       Patient Instructions History      Upcoming Appointments     Visit Type Date Time Department    OFFICE VISIT 1/31/2017  8:45 AM [facility] JAMEY HEALY      [facility] Signup     Our records indicate that you have declined [employer] signup. If you would like to sign up for [facility], please email [email or web address] or call [phone] to obtain an activation code.             Other Info from Your Visit           Allergies     Promethazine        Reason for Visit     Follow-up presents as a follow up    Hypertension           Vital Signs     Blood Pressure Pulse Height Weight Oxygen Saturation Body Mass Index    202/58 (BP Location: Left arm, Patient Position: Sitting) 57 64\" (162.6 cm) 140 lb 3.2 oz (63.6 kg) 94% 24.07 kg/m2    Smoking Status                   Former Smoker           Problems and Diagnoses Noted     Abnormal vascular sound    High cholesterol    High blood pressure    Multiple vessel coronary artery disease    Peripheral vascular disease    Routine medical exam          Medications Administered     nitroglycerin (NITROSTAT) SL tablet 0.4 mg                      "

## 2017-01-31 NOTE — PATIENT INSTRUCTIONS
Edema  Edema is an abnormal buildup of fluids in your body tissues. Edema is somewhat dependent on gravity to pull the fluid to the lowest place in your body. That makes the condition more common in the legs and thighs (lower extremities). Painless swelling of the feet and ankles is common and becomes more likely as you get older. It is also common in looser tissues, like around your eyes.   When the affected area is squeezed, the fluid may move out of that spot and leave a dent for a few moments. This dent is called pitting.   CAUSES   There are many possible causes of edema. Eating too much salt and being on your feet or sitting for a long time can cause edema in your legs and ankles. Hot weather may make edema worse. Common medical causes of edema include:  · Heart failure.  · Liver disease.  · Kidney disease.  · Weak blood vessels in your legs.  · Cancer.  · An injury.  · Pregnancy.  · Some medications.  · Obesity.   SYMPTOMS   Edema is usually painless. Your skin may look swollen or shiny.   DIAGNOSIS   Your health care provider may be able to diagnose edema by asking about your medical history and doing a physical exam. You may need to have tests such as X-rays, an electrocardiogram, or blood tests to check for medical conditions that may cause edema.   TREATMENT   Edema treatment depends on the cause. If you have heart, liver, or kidney disease, you need the treatment appropriate for these conditions. General treatment may include:  · Elevation of the affected body part above the level of your heart.  · Compression of the affected body part. Pressure from elastic bandages or support stockings squeezes the tissues and forces fluid back into the blood vessels. This keeps fluid from entering the tissues.  · Restriction of fluid and salt intake.  · Use of a water pill (diuretic). These medications are appropriate only for some types of edema. They pull fluid out of your body and make you urinate more often. This  gets rid of fluid and reduces swelling, but diuretics can have side effects. Only use diuretics as directed by your health care provider.  HOME CARE INSTRUCTIONS   · Keep the affected body part above the level of your heart when you are lying down.    · Do not sit still or stand for prolonged periods.    · Do not put anything directly under your knees when lying down.  · Do not wear constricting clothing or garters on your upper legs.    · Exercise your legs to work the fluid back into your blood vessels. This may help the swelling go down.    · Wear elastic bandages or support stockings to reduce ankle swelling as directed by your health care provider.    · Eat a low-salt diet to reduce fluid if your health care provider recommends it.    · Only take medicines as directed by your health care provider.   SEEK MEDICAL CARE IF:   · Your edema is not responding to treatment.  · You have heart, liver, or kidney disease and notice symptoms of edema.  · You have edema in your legs that does not improve after elevating them.    · You have sudden and unexplained weight gain.  SEEK IMMEDIATE MEDICAL CARE IF:   · You develop shortness of breath or chest pain.    · You cannot breathe when you lie down.  · You develop pain, redness, or warmth in the swollen areas.    · You have heart, liver, or kidney disease and suddenly get edema.  · You have a fever and your symptoms suddenly get worse.  MAKE SURE YOU:   · Understand these instructions.  · Will watch your condition.  · Will get help right away if you are not doing well or get worse.     This information is not intended to replace advice given to you by your health care provider. Make sure you discuss any questions you have with your health care provider.     Document Released: 12/18/2006 Document Revised: 01/08/2016 Document Reviewed: 10/10/2014  [employer] Interactive Patient Education ©2016 [employer]  Hypertension  Hypertension, commonly called high blood pressure, is  when the force of blood pumping through your arteries is too strong. Your arteries are the blood vessels that carry blood from your heart throughout your body. A blood pressure reading consists of a higher number over a lower number, such as 110/72. The higher number (systolic) is the pressure inside your arteries when your heart pumps. The lower number (diastolic) is the pressure inside your arteries when your heart relaxes. Ideally you want your blood pressure below 120/80.  Hypertension forces your heart to work harder to pump blood. Your arteries may become narrow or stiff. Having untreated or uncontrolled hypertension can cause heart attack, stroke, kidney disease, and other problems.  RISK FACTORS  Some risk factors for high blood pressure are controllable. Others are not.   Risk factors you cannot control include:   · Race. You may be at higher risk if you are .  · Age. Risk increases with age.  · Gender. Men are at higher risk than women before age 45 years. After age 65, women are at higher risk than men.  Risk factors you can control include:  · Not getting enough exercise or physical activity.  · Being overweight.  · Getting too much fat, sugar, calories, or salt in your diet.  · Drinking too much alcohol.  SIGNS AND SYMPTOMS  Hypertension does not usually cause signs or symptoms. Extremely high blood pressure (hypertensive crisis) may cause headache, anxiety, shortness of breath, and nosebleed.  DIAGNOSIS  To check if you have hypertension, your health care provider will measure your blood pressure while you are seated, with your arm held at the level of your heart. It should be measured at least twice using the same arm. Certain conditions can cause a difference in blood pressure between your right and left arms. A blood pressure reading that is higher than normal on one occasion does not mean that you need treatment. If it is not clear whether you have high blood pressure, you may be  asked to return on a different day to have your blood pressure checked again. Or, you may be asked to monitor your blood pressure at home for 1 or more weeks.  TREATMENT  Treating high blood pressure includes making lifestyle changes and possibly taking medicine. Living a healthy lifestyle can help lower high blood pressure. You may need to change some of your habits.  Lifestyle changes may include:  · Following the DASH diet. This diet is high in fruits, vegetables, and whole grains. It is low in salt, red meat, and added sugars.  · Keep your sodium intake below 2,300 mg per day.  · Getting at least 30-45 minutes of aerobic exercise at least 4 times per week.  · Losing weight if necessary.  · Not smoking.  · Limiting alcoholic beverages.  · Learning ways to reduce stress.  Your health care provider may prescribe medicine if lifestyle changes are not enough to get your blood pressure under control, and if one of the following is true:  · You are 18-59 years of age and your systolic blood pressure is above 140.  · You are 60 years of age or older, and your systolic blood pressure is above 150.  · Your diastolic blood pressure is above 90.  · You have diabetes, and your systolic blood pressure is over 140 or your diastolic blood pressure is over 90.  · You have kidney disease and your blood pressure is above 140/90.  · You have heart disease and your blood pressure is above 140/90.  Your personal target blood pressure may vary depending on your medical conditions, your age, and other factors.  HOME CARE INSTRUCTIONS  · Have your blood pressure rechecked as directed by your health care provider.    · Take medicines only as directed by your health care provider. Follow the directions carefully. Blood pressure medicines must be taken as prescribed. The medicine does not work as well when you skip doses. Skipping doses also puts you at risk for problems.  · Do not smoke.    · Monitor your blood pressure at home as  directed by your health care provider.   SEEK MEDICAL CARE IF:   · You think you are having a reaction to medicines taken.  · You have recurrent headaches or feel dizzy.  · You have swelling in your ankles.  · You have trouble with your vision.  SEEK IMMEDIATE MEDICAL CARE IF:  · You develop a severe headache or confusion.  · You have unusual weakness, numbness, or feel faint.  · You have severe chest or abdominal pain.  · You vomit repeatedly.  · You have trouble breathing.  MAKE SURE YOU:   · Understand these instructions.  · Will watch your condition.  · Will get help right away if you are not doing well or get worse.     This information is not intended to replace advice given to you by your health care provider. Make sure you discuss any questions you have with your health care provider.     Document Released: 12/18/2006 Document Revised: 05/03/2016 Document Reviewed: 10/10/2014  [employer] Interactive Patient Education ©2016 [employer]

## 2017-02-02 DIAGNOSIS — R79.89 ELEVATED SERUM CREATININE: Primary | ICD-10-CM

## 2017-02-02 RX ORDER — MINOXIDIL 10 MG/1
10 TABLET ORAL DAILY
Qty: 30 TABLET | Refills: 5 | Status: SHIPPED | OUTPATIENT
Start: 2017-02-16 | End: 2017-07-27 | Stop reason: SDUPTHER

## 2017-02-14 ENCOUNTER — TELEPHONE (OUTPATIENT)
Dept: CARDIOLOGY | Facility: CLINIC | Age: 82
End: 2017-02-14

## 2017-02-14 NOTE — TELEPHONE ENCOUNTER
----- Message from DEBBY Woody sent at 2/13/2017  4:11 PM EST -----  Advise patient and fax to nephrology and PCP    Spoke with patient. She is currently in the hospital for Evaluation of Kidney Disease. She states she is being followed by Nephrology in the Hospital.

## 2017-03-09 ENCOUNTER — OFFICE VISIT (OUTPATIENT)
Dept: CARDIOLOGY | Facility: CLINIC | Age: 82
End: 2017-03-09

## 2017-03-09 VITALS
WEIGHT: 137.6 LBS | HEART RATE: 63 BPM | DIASTOLIC BLOOD PRESSURE: 55 MMHG | BODY MASS INDEX: 23.49 KG/M2 | SYSTOLIC BLOOD PRESSURE: 138 MMHG | OXYGEN SATURATION: 94 % | HEIGHT: 64 IN

## 2017-03-09 DIAGNOSIS — I73.9 PERIPHERAL VASCULAR DISEASE (HCC): ICD-10-CM

## 2017-03-09 DIAGNOSIS — I25.810 CORONARY ARTERY DISEASE INVOLVING CORONARY BYPASS GRAFT OF NATIVE HEART WITHOUT ANGINA PECTORIS: Primary | ICD-10-CM

## 2017-03-09 DIAGNOSIS — E78.00 HYPERCHOLESTEROLEMIA: ICD-10-CM

## 2017-03-09 DIAGNOSIS — I10 ESSENTIAL HYPERTENSION: ICD-10-CM

## 2017-03-09 PROCEDURE — 99213 OFFICE O/P EST LOW 20 MIN: CPT | Performed by: NURSE PRACTITIONER

## 2017-03-09 RX ORDER — NITROGLYCERIN 0.4 MG/1
TABLET SUBLINGUAL
Qty: 30 TABLET | Refills: 3 | Status: SHIPPED | OUTPATIENT
Start: 2017-03-09

## 2017-03-09 NOTE — PROGRESS NOTES
Subjective   Cici Winter is a 89 y.o. female     Chief Complaint   Patient presents with   • Follow-up     presents as a hospital follow up       HPI    Problem List:    1. Coronary artery disease with history of bypass, remote in 97 by Dr. Hayden.  1.1 Recent catheterization, April 2014 with angioplasty and stent to SVG of left circumflex and drug-eluting stent to SVG of RCA and posterolateral branch by Dr. Siu at [facility].  2. Ischemic cardiomyopathy, EF of 30-35% by echo, March 2014.  1.2 Stress test 9/16/15 - mild lateral and posterolateral ischemia   2.1 Follow-up echocardiogram, April 2014 indicated EF of 55-60%.  2.2 Echo 9/16/15 - Mild LVH; EF > 65%; DD I; mild AR, MR and TR; Mod [place] PA 35-40  2.3 Echo 2/13/17 - EF 60%; mild MR and TR; PA 49; trivial PI  3. Hypertension  4. Dyslipidemia  5. Preserved systolic function  6. CKD IV     Patient is a 89-year-old female who presents today for a follow-up with her two daughters at her side.  She denies any chest pain, pressure, palpitations, fluttering, dizziness, presyncope, syncope, orthopnea, PND or edema.  She says she has been doing very well and that she really liked Dr. Ortiz.  They did discuss RRT, but she is not wanting at this time.      We went over echo from hospital and renal ultrasound.   Current Outpatient Prescriptions   Medication Sig Dispense Refill   • aspirin 81 MG tablet Take  by mouth daily.     • carvedilol (COREG) 6.25 MG tablet TAKE 6.25 MG in PM and 12.5 mg in PM. 45 tablet 5   • CloNIDine (CATAPRES-TTS) 0.1 MG/24HR patch Place 1 patch on the skin 1 (One) Time Per Week. 5 patch 6   • dipyridamole (PERSANTINE) 75 MG tablet Take  by mouth.     • isosorbide dinitrate (ISORDIL) 10 MG tablet Take  by mouth every 8 (eight) hours.     • KRILL OIL PO Take  by mouth daily.     • methimazole (TAPAZOLE) 5 MG tablet Take 0.5 tablets by mouth daily.     • minoxidil (LONITEN) 10 MG tablet Take 1 tablet by mouth Daily. 30 tablet 5   • O2  (OXYGEN) Inhale 2 L/min 1 (One) Time.     • potassium chloride (MICRO-K) 10 MEQ CR capsule Take 10 mEq by mouth Daily As Needed (with lasix).     • pravastatin (PRAVACHOL) 40 MG tablet Take  by mouth every other day.     • terazosin (HYTRIN) 2 MG capsule TAKE 1 CAPSULE DAILY 30 capsule 5   • VITAMIN B1-B12 IJ Inject  as directed. Monthly     • vitamin D (ERGOCALCIFEROL) 35682 UNITS capsule capsule Take  by mouth 1 (one) time per week.     • furosemide (LASIX) 40 MG tablet Take 80 mg by mouth Daily.     • NIFEdipine CC (ADALAT CC) 90 MG 24 hr tablet Take 1 tablet by mouth Daily. 30 tablet 5   • nitroglycerin (NITROSTAT) 0.4 MG SL tablet 1 under the tongue as needed for angina, may repeat q5mins for up three doses 30 tablet 3     Current Facility-Administered Medications   Medication Dose Route Frequency Provider Last Rate Last Dose   • CloNIDine (CATAPRES) tablet 0.1 mg  0.1 mg Oral Once DEBBY Woody    Promethazine    Past Medical History   Diagnosis Date   • Carotid bruit    • Coronary artery disease    • Hyperlipidemia    • Hypertension    • Ischemic cardiomyopathy    • Leg pain      with exercise (leg claudication)   • PVD (peripheral vascular disease)    • Sinus bradycardia    • SOB (shortness of breath)        Social History     Social History   • Marital status:      Spouse name: N/A   • Number of children: N/A   • Years of education: N/A     Occupational History   • Not on file.     Social History Main Topics   • Smoking status: Former Smoker     Types: Cigarettes     Quit date: 6/29/1976   • Smokeless tobacco: Never Used   • Alcohol use No   • Drug use: No   • Sexual activity: Not on file     Other Topics Concern   • Not on file     Social History Narrative       Family History   Problem Relation Age of Onset   • Diabetes Mother    • Other Brother      CABG   • Hypertension Brother        Review of Systems   Constitutional: Positive for fatigue. Negative for diaphoresis.  "  HENT: Positive for hearing loss and sneezing. Negative for rhinorrhea.    Eyes: Positive for visual disturbance (reading glasses ).   Respiratory: Positive for shortness of breath (using O2 2L daily; doing much better. ). Negative for chest tightness.    Cardiovascular: Negative for chest pain, palpitations and leg swelling.   Gastrointestinal: Negative for nausea and vomiting.   Endocrine: Negative.    Genitourinary: Positive for frequency. Negative for difficulty urinating.   Musculoskeletal: Positive for arthralgias and myalgias. Negative for back pain and neck pain.   Skin: Negative.    Allergic/Immunologic: Positive for environmental allergies.   Neurological: Negative for dizziness, syncope and light-headedness.   Hematological: Bruises/bleeds easily.   Psychiatric/Behavioral: Negative.        Objective   Visit Vitals   • /55 (BP Location: Left arm, Patient Position: Sitting)   • Pulse 63   • Ht 64\" (162.6 cm)   • Wt 137 lb 9.6 oz (62.4 kg)   • SpO2 94%   • BMI 23.62 kg/m2     Lab Results (most recent)     None        Physical Exam   Constitutional: She is oriented to person, place, and time. Vital signs are normal. She appears well-developed and well-nourished. She is active and cooperative.   HENT:   Head: Normocephalic.   Eyes: Lids are normal.   Neck: Normal carotid pulses, no hepatojugular reflux and no JVD present. Carotid bruit is not present.   Cardiovascular: Normal rate, regular rhythm and normal heart sounds.    Pulses:       Radial pulses are 2+ on the right side, and 2+ on the left side.        Dorsalis pedis pulses are 2+ on the right side, and 2+ on the left side.        Posterior tibial pulses are 2+ on the right side, and 2+ on the left side.   No edema BLE.   Pulmonary/Chest: Effort normal and breath sounds normal.   O2 2L NC.    Abdominal: Normal appearance.   Neurological: She is alert and oriented to person, place, and time.   Skin: Skin is warm, dry and intact.   Psychiatric: She " has a normal mood and affect. Her speech is normal and behavior is normal. Judgment and thought content normal. Cognition and memory are normal.       Procedure   Procedures         Assessment/Plan      Diagnosis Plan   1. Coronary artery disease involving coronary bypass graft of native heart without angina pectoris  nitroglycerin (NITROSTAT) 0.4 MG SL tablet   2. Essential hypertension     3. Peripheral vascular disease     4. Hypercholesterolemia         Return in about 3 months (around 6/9/2017).    Patient is doing much better.  We will stop her nifedipine as they had already and his BP is doing well.  She will continue to monitor her BP/HR.  She will continue her medication regimen otherwise.  She will follow-up in 3 mos or sooner if any changes.

## 2017-03-09 NOTE — PATIENT INSTRUCTIONS
Chronic Kidney Disease, Adult  Chronic kidney disease (CKD) occurs when the kidneys are damaged during a period of 3 or more months. The kidneys are two organs that do many important jobs in the body, which include:  · Removing wastes and extra fluids from the blood.  · Making hormones that maintain the amount of fluid in your tissues and blood vessels.  · Maintaining the right amount of fluids and chemicals in the body.  A small amount of kidney damage may not cause problems, but a large amount of damage may make it difficult or impossible for the kidneys to work the way they should. If steps are not taken to slow down the kidney damage or stop it from getting worse, the kidneys may stop working permanently (end stage kidney disease). Most of the time, CKD does not go away, but it can often be controlled. People who have CKD can usually live normal lives.  RISK FACTORS  This condition is more likely to develop in people who are:  · Older than age 60.  · Female.  · Of -American descent.  · Current smokers or former smokers.  · Obese.  You may also have an increased risk for CKD if you have a family history of CKD or you frequently take medicines that are damaging to the kidneys.  CAUSES  The most common causes of this condition are diabetes and high blood pressure (hypertension). Other causes include:  · Heart and blood vessel (cardiovascular) disease.  · Kidney diseases:    Glomerulonephritis.    Interstitial nephritis.    Polycystic kidney disease.    Renal vascular disease.  · Diseases that affect the immune system.  · Genetic diseases.  · Medicines that damage the kidneys, such as anti-inflammatory medicines.  · Poisoning.  · Being around or in contact with poisonous (toxic) substances.  · A kidney or urinary infection that occurs again (recurs).  · Vasculitis.  · Repeat kidney infections.  · A problem with urine flow that may be caused by:    Cancer.    Having kidney stones more than one time.    An  enlarged prostate in males.  SIGNS AND SYMPTOMS  Symptoms develop slowly and may not be obvious until the kidney damage becomes severe.  It is possible to have a kidney disease for years without showing any symptoms. Symptoms of this condition can include:  · Swelling (edema) of the face, legs, ankles, or feet.  · Numbness, tingling, or loss of feeling (sensation) in the hands or feet.  · Tiredness (lethargy).  · Nausea or vomiting.  · Confusion or trouble concentrating.  · Problems with urination, such as:    Painful or burning feeling during urination.    Decreased urine production.    Frequent urination, especially at night.    Bloody urine.  · Muscle twitches and cramps, especially in the legs.  · Shortness of breath.  · Weakness.  · Constant itchiness.  · Loss of appetite.  · Metallic taste in the mouth.  · Trouble sleeping.  · Pale lining of the eyelids and surface of the eye (conjunctiva).  DIAGNOSIS  This condition may be diagnosed with various tests. Tests may include:  · Blood tests.  · Urine tests.  · Imaging tests.  · A test in which a sample of tissue is removed from the kidneys to be looked at under a microscope (kidney biopsy).  These test results will help your health care provider determine what class of CKD you have.  TREATMENT  Most cases of CKD cannot be cured. Treatment usually involves relieving symptoms and preventing or slowing the progression of the disease. Treatment may include:  · A special diet, which may require you to avoid alcohol, salty foods (sodium), and foods that are high in potassium, calcium, and protein.  · Medicines:    To lower blood pressure.    To relieve low blood count (anemia).    To relieve swelling.    To protect your bones.    To improve the balance of electrolytes in your blood.  · Removing toxic waste from the body by using hemodialysis or peritoneal dialysis if the kidneys can no longer do their job (kidney failure).  · Management of other conditions that are  causing your CKD or making it worse.  HOME CARE INSTRUCTIONS  · Follow your prescribed diet.  · Take over-the-counter and prescription medicines only as told by your health care provider.    Do not take any new medicines unless approved by your health care provider. Many medicines can worsen your kidney damage.    Do not take any vitamin and mineral supplements unless approved by your health care provider. Many nutritional supplements can worsen your kidney damage.    The dose of some medicines that you take may need to be adjusted.  · Do not use any tobacco products, such as cigarettes, chewing tobacco, and e-cigarettes. If you need help quitting, ask your health care provider.  · Keep all follow-up visits as told by your health care provider. This is important.  · Keep track of your blood pressure. Report changes in your blood pressure as told by your health care provider.  · Achieve and maintain a healthy weight. If you need help with this, ask your health care provider.  · Start or continue an exercise plan. Try to exercise at least 30 minutes a day, 5 days a week.  · Stay current with immunizations as told by your health care provider.  SEEK MEDICAL CARE IF:  · Your symptoms get worse.  · You develop new symptoms.  SEEK IMMEDIATE MEDICAL CARE IF:  · You develop symptoms of end-stage kidney disease, which include:    Headaches.    Abnormally dark or light skin.    Numbness in the hands or feet.    Easy bruising.    Frequent hiccups.    Chest pain.    Shortness of breath.    End of menstruation in women.  · You have a fever.  · You have decreased urine production.  · You have pain or bleeding when you urinate.  FOR MORE INFORMATION   · American Association of Kidney Patients: www.aakp.org  · National Kidney Foundation: www.kidney.org  · American Kidney Fund: www.akfinc.org  · [employer] [facility]: www.lifeoptions.org and www.kidneyschool.org     This information is not intended to replace advice  given to you by your health care provider. Make sure you discuss any questions you have with your health care provider.     Document Released: 09/26/2009 Document Revised: 11/28/2016 Document Reviewed: 08/16/2013  [employer] Interactive Patient Education ©2016 [employer]  Edema  Edema is an abnormal buildup of fluids in your body tissues. Edema is somewhat dependent on gravity to pull the fluid to the lowest place in your body. That makes the condition more common in the legs and thighs (lower extremities). Painless swelling of the feet and ankles is common and becomes more likely as you get older. It is also common in looser tissues, like around your eyes.   When the affected area is squeezed, the fluid may move out of that spot and leave a dent for a few moments. This dent is called pitting.   CAUSES   There are many possible causes of edema. Eating too much salt and being on your feet or sitting for a long time can cause edema in your legs and ankles. Hot weather may make edema worse. Common medical causes of edema include:  · Heart failure.  · Liver disease.  · Kidney disease.  · Weak blood vessels in your legs.  · Cancer.  · An injury.  · Pregnancy.  · Some medications.  · Obesity.   SYMPTOMS   Edema is usually painless. Your skin may look swollen or shiny.   DIAGNOSIS   Your health care provider may be able to diagnose edema by asking about your medical history and doing a physical exam. You may need to have tests such as X-rays, an electrocardiogram, or blood tests to check for medical conditions that may cause edema.   TREATMENT   Edema treatment depends on the cause. If you have heart, liver, or kidney disease, you need the treatment appropriate for these conditions. General treatment may include:  · Elevation of the affected body part above the level of your heart.  · Compression of the affected body part. Pressure from elastic bandages or support stockings squeezes the tissues and forces fluid back into  the blood vessels. This keeps fluid from entering the tissues.  · Restriction of fluid and salt intake.  · Use of a water pill (diuretic). These medications are appropriate only for some types of edema. They pull fluid out of your body and make you urinate more often. This gets rid of fluid and reduces swelling, but diuretics can have side effects. Only use diuretics as directed by your health care provider.  HOME CARE INSTRUCTIONS   · Keep the affected body part above the level of your heart when you are lying down.    · Do not sit still or stand for prolonged periods.    · Do not put anything directly under your knees when lying down.  · Do not wear constricting clothing or garters on your upper legs.    · Exercise your legs to work the fluid back into your blood vessels. This may help the swelling go down.    · Wear elastic bandages or support stockings to reduce ankle swelling as directed by your health care provider.    · Eat a low-salt diet to reduce fluid if your health care provider recommends it.    · Only take medicines as directed by your health care provider.   SEEK MEDICAL CARE IF:   · Your edema is not responding to treatment.  · You have heart, liver, or kidney disease and notice symptoms of edema.  · You have edema in your legs that does not improve after elevating them.    · You have sudden and unexplained weight gain.  SEEK IMMEDIATE MEDICAL CARE IF:   · You develop shortness of breath or chest pain.    · You cannot breathe when you lie down.  · You develop pain, redness, or warmth in the swollen areas.    · You have heart, liver, or kidney disease and suddenly get edema.  · You have a fever and your symptoms suddenly get worse.  MAKE SURE YOU:   · Understand these instructions.  · Will watch your condition.  · Will get help right away if you are not doing well or get worse.     This information is not intended to replace advice given to you by your health care provider. Make sure you discuss any  questions you have with your health care provider.     Document Released: 12/18/2006 Document Revised: 01/08/2016 Document Reviewed: 10/10/2014  [employer] Interactive Patient Education ©2016 [employer]

## 2017-06-12 ENCOUNTER — OFFICE VISIT (OUTPATIENT)
Dept: CARDIOLOGY | Facility: CLINIC | Age: 82
End: 2017-06-12

## 2017-06-12 VITALS
WEIGHT: 134.4 LBS | HEART RATE: 60 BPM | HEIGHT: 64 IN | BODY MASS INDEX: 22.94 KG/M2 | OXYGEN SATURATION: 96 % | RESPIRATION RATE: 16 BRPM | DIASTOLIC BLOOD PRESSURE: 54 MMHG | SYSTOLIC BLOOD PRESSURE: 161 MMHG

## 2017-06-12 DIAGNOSIS — I25.810 CORONARY ARTERY DISEASE INVOLVING CORONARY BYPASS GRAFT OF NATIVE HEART WITHOUT ANGINA PECTORIS: ICD-10-CM

## 2017-06-12 DIAGNOSIS — E78.5 DYSLIPIDEMIA: ICD-10-CM

## 2017-06-12 DIAGNOSIS — R00.1 SINUS BRADYCARDIA: ICD-10-CM

## 2017-06-12 DIAGNOSIS — I10 ESSENTIAL HYPERTENSION: Primary | ICD-10-CM

## 2017-06-12 PROCEDURE — 99213 OFFICE O/P EST LOW 20 MIN: CPT | Performed by: NURSE PRACTITIONER

## 2017-06-12 RX ORDER — TERAZOSIN 2 MG/1
2 CAPSULE ORAL NIGHTLY
Qty: 30 CAPSULE | Refills: 5 | Status: SHIPPED | OUTPATIENT
Start: 2017-06-12 | End: 2018-02-21 | Stop reason: SDUPTHER

## 2017-06-12 NOTE — PROGRESS NOTES
"Subjective   Cici Winter is a 89 y.o. female     Chief Complaint   Patient presents with   • Hypertension       HPI    Problem List:    1. Coronary artery disease with history of bypass, remote in 97 by Dr. Hayden.  1.1 Recent catheterization, April 2014 with angioplasty and stent to SVG of left circumflex and drug-eluting stent to SVG of RCA and posterolateral branch by Dr. Siu at [facility].  2. Ischemic cardiomyopathy, EF of 30-35% by echo, March 2014.  1.2 Stress test 9/16/15 - mild lateral and posterolateral ischemia   2.1 Follow-up echocardiogram, April 2014 indicated EF of 55-60%.  2.2 Echo 9/16/15 - Mild LVH; EF > 65%; DD I; mild AR, MR and TR; Mod [place] PA 35-40  2.3 Echo 2/13/17 - EF 60%; mild MR and TR; PA 49; trivial PI  3. Hypertension  4. Dyslipidemia  5. Preserved systolic function  6. CKD IV - Dr. Ortiz     Patient is a 89-year-old female who presents today for a follow-up with her daughter at her side.  She denies any chest pain, pressure, palpitations, fluttering, dizziness, presyncope, syncope, orthopnea or PND.  She does have some edema.  She says she had been sick with bronchitis and just finished Levaquin.  She says she just feels \"blah\".  She has had some morning nausea for the past three days.  She does have some post nasal drip, recommended using nasal spray.  She has had some shortness of breath since she has been sick.  She goes and sees Dr. Ortiz in Oct.      Current Outpatient Prescriptions   Medication Sig Dispense Refill   • aspirin 81 MG tablet Take  by mouth daily.     • carvedilol (COREG) 6.25 MG tablet TAKE 6.25 MG in PM and 12.5 mg in PM. (Patient taking differently: Take  by mouth 2 (Two) Times a Day With Meals.) 45 tablet 5   • CloNIDine (CATAPRES-TTS) 0.1 MG/24HR patch Place 1 patch on the skin 1 (One) Time Per Week. 5 patch 6   • furosemide (LASIX) 40 MG tablet Take 80 mg by mouth Daily.     • isosorbide dinitrate (ISORDIL) 10 MG tablet Take  by mouth every 8 " (eight) hours.     • KRILL OIL PO Take  by mouth daily.     • methimazole (TAPAZOLE) 5 MG tablet Take 0.5 tablets by mouth daily.     • minoxidil (LONITEN) 10 MG tablet Take 1 tablet by mouth Daily. 30 tablet 5   • nitroglycerin (NITROSTAT) 0.4 MG SL tablet 1 under the tongue as needed for angina, may repeat q5mins for up three doses 30 tablet 3   • potassium chloride (MICRO-K) 10 MEQ CR capsule Take 10 mEq by mouth Daily As Needed (with lasix).     • terazosin (HYTRIN) 2 MG capsule Take 1 capsule by mouth Every Night. 30 capsule 5   • VITAMIN B1-B12 IJ Inject  as directed. Monthly     • vitamin D (ERGOCALCIFEROL) 32234 UNITS capsule capsule Take  by mouth 1 (one) time per week.       Current Facility-Administered Medications   Medication Dose Route Frequency Provider Last Rate Last Dose   • CloNIDine (CATAPRES) tablet 0.1 mg  0.1 mg Oral Once DEBBY Woody    Promethazine    Past Medical History:   Diagnosis Date   • Carotid bruit    • Coronary artery disease    • Hyperlipidemia    • Hypertension    • Ischemic cardiomyopathy    • Leg pain     with exercise (leg claudication)   • PVD (peripheral vascular disease)    • Sinus bradycardia    • SOB (shortness of breath)        Social History     Social History   • Marital status:      Spouse name: N/A   • Number of children: N/A   • Years of education: N/A     Occupational History   • Not on file.     Social History Main Topics   • Smoking status: Former Smoker     Types: Cigarettes     Quit date: 6/29/1976   • Smokeless tobacco: Never Used   • Alcohol use No   • Drug use: No   • Sexual activity: Not on file     Other Topics Concern   • Not on file     Social History Narrative       Family History   Problem Relation Age of Onset   • Diabetes Mother    • Other Brother      CABG   • Hypertension Brother        Review of Systems   Constitutional: Positive for appetite change and fatigue (worn out ). Negative for diaphoresis.   HENT:  "Positive for postnasal drip and sneezing. Negative for rhinorrhea.    Eyes: Positive for visual disturbance (reading glasses ).   Respiratory: Positive for shortness of breath (since she has been sick ) and wheezing. Negative for chest tightness.    Cardiovascular: Positive for leg swelling. Negative for chest pain and palpitations.   Gastrointestinal: Positive for nausea (last three mornings). Negative for abdominal pain and vomiting.   Endocrine: Negative.    Genitourinary: Negative for difficulty urinating.   Musculoskeletal: Negative for arthralgias, back pain and neck pain.   Allergic/Immunologic: Positive for environmental allergies.   Neurological: Negative for dizziness, syncope and light-headedness.   Hematological: Negative.    Psychiatric/Behavioral: Negative.        Objective   /54 (BP Location: Left leg, Patient Position: Sitting)  Pulse 60  Resp 16  Ht 64\" (162.6 cm)  Wt 134 lb 6.4 oz (61 kg)  SpO2 96%  BMI 23.07 kg/m2  Lab Results (most recent)     None        Physical Exam   Constitutional: She is oriented to person, place, and time. Vital signs are normal. She appears well-developed and well-nourished. She is active and cooperative.   HENT:   Head: Normocephalic.   Eyes: Lids are normal.   Neck: Normal carotid pulses, no hepatojugular reflux and no JVD present. Carotid bruit is not present.   Cardiovascular: Normal rate, regular rhythm and normal heart sounds.    Pulses:       Radial pulses are 2+ on the right side, and 2+ on the left side.        Dorsalis pedis pulses are 2+ on the right side, and 2+ on the left side.        Posterior tibial pulses are 2+ on the right side, and 2+ on the left side.   Trace edema right ankle; no edema LLE.    Pulmonary/Chest: Effort normal and breath sounds normal.   Abdominal: Normal appearance and bowel sounds are normal.   Neurological: She is alert and oriented to person, place, and time.   Skin: Skin is warm, dry and intact.   Psychiatric: She has a " normal mood and affect. Her speech is normal and behavior is normal. Judgment and thought content normal. Cognition and memory are normal.         Assessment/Plan      Diagnosis Plan   1. Essential hypertension  terazosin (HYTRIN) 2 MG capsule   2. Sinus bradycardia     3. Coronary artery disease involving coronary bypass graft of native heart without angina pectoris     4. Dyslipidemia         Return in about 3 months (around 9/12/2017).       Hypertension - she has been out of Terazosin so I will send in a refill otherwise her BP has been stable.  Sinus Bradycardia - doing well.  CAD - on ASA, Beta and her PCP took her off of statin?.  Dyslipidemia - again PCP stopped statin.  Patient is doing well.  She will continue her medication regimen.  She will follow-up in 3 mos or sooner if any changes.

## 2017-06-12 NOTE — PATIENT INSTRUCTIONS
Edema  Edema is an abnormal buildup of fluids in your body tissues. Edema is somewhat dependent on gravity to pull the fluid to the lowest place in your body. That makes the condition more common in the legs and thighs (lower extremities). Painless swelling of the feet and ankles is common and becomes more likely as you get older. It is also common in looser tissues, like around your eyes.   When the affected area is squeezed, the fluid may move out of that spot and leave a dent for a few moments. This dent is called pitting.   CAUSES   There are many possible causes of edema. Eating too much salt and being on your feet or sitting for a long time can cause edema in your legs and ankles. Hot weather may make edema worse. Common medical causes of edema include:  · Heart failure.  · Liver disease.  · Kidney disease.  · Weak blood vessels in your legs.  · Cancer.  · An injury.  · Pregnancy.  · Some medications.  · Obesity.   SYMPTOMS   Edema is usually painless. Your skin may look swollen or shiny.   DIAGNOSIS   Your health care provider may be able to diagnose edema by asking about your medical history and doing a physical exam. You may need to have tests such as X-rays, an electrocardiogram, or blood tests to check for medical conditions that may cause edema.   TREATMENT   Edema treatment depends on the cause. If you have heart, liver, or kidney disease, you need the treatment appropriate for these conditions. General treatment may include:  · Elevation of the affected body part above the level of your heart.  · Compression of the affected body part. Pressure from elastic bandages or support stockings squeezes the tissues and forces fluid back into the blood vessels. This keeps fluid from entering the tissues.  · Restriction of fluid and salt intake.  · Use of a water pill (diuretic). These medications are appropriate only for some types of edema. They pull fluid out of your body and make you urinate more often. This  gets rid of fluid and reduces swelling, but diuretics can have side effects. Only use diuretics as directed by your health care provider.  HOME CARE INSTRUCTIONS   · Keep the affected body part above the level of your heart when you are lying down.    · Do not sit still or stand for prolonged periods.    · Do not put anything directly under your knees when lying down.  · Do not wear constricting clothing or garters on your upper legs.    · Exercise your legs to work the fluid back into your blood vessels. This may help the swelling go down.    · Wear elastic bandages or support stockings to reduce ankle swelling as directed by your health care provider.    · Eat a low-salt diet to reduce fluid if your health care provider recommends it.    · Only take medicines as directed by your health care provider.   SEEK MEDICAL CARE IF:   · Your edema is not responding to treatment.  · You have heart, liver, or kidney disease and notice symptoms of edema.  · You have edema in your legs that does not improve after elevating them.    · You have sudden and unexplained weight gain.  SEEK IMMEDIATE MEDICAL CARE IF:   · You develop shortness of breath or chest pain.    · You cannot breathe when you lie down.  · You develop pain, redness, or warmth in the swollen areas.    · You have heart, liver, or kidney disease and suddenly get edema.  · You have a fever and your symptoms suddenly get worse.  MAKE SURE YOU:   · Understand these instructions.  · Will watch your condition.  · Will get help right away if you are not doing well or get worse.     This information is not intended to replace advice given to you by your health care provider. Make sure you discuss any questions you have with your health care provider.     Document Released: 12/18/2006 Document Revised: 04/10/2017 Document Reviewed: 10/10/2014  [employer] Interactive Patient Education ©2017 [employer]

## 2017-07-27 RX ORDER — MINOXIDIL 10 MG/1
TABLET ORAL
Qty: 30 TABLET | Refills: 5 | Status: SHIPPED | OUTPATIENT
Start: 2017-07-27 | End: 2018-01-23 | Stop reason: SDUPTHER

## 2017-09-15 ENCOUNTER — OFFICE VISIT (OUTPATIENT)
Dept: CARDIOLOGY | Facility: CLINIC | Age: 82
End: 2017-09-15

## 2017-09-15 VITALS
HEART RATE: 63 BPM | DIASTOLIC BLOOD PRESSURE: 59 MMHG | WEIGHT: 131.4 LBS | HEIGHT: 64 IN | BODY MASS INDEX: 22.43 KG/M2 | OXYGEN SATURATION: 96 % | SYSTOLIC BLOOD PRESSURE: 146 MMHG

## 2017-09-15 DIAGNOSIS — I10 ESSENTIAL HYPERTENSION: ICD-10-CM

## 2017-09-15 DIAGNOSIS — R07.9 CHEST PAIN, UNSPECIFIED TYPE: ICD-10-CM

## 2017-09-15 DIAGNOSIS — E78.5 DYSLIPIDEMIA: ICD-10-CM

## 2017-09-15 DIAGNOSIS — R00.1 SINUS BRADYCARDIA: ICD-10-CM

## 2017-09-15 DIAGNOSIS — Z00.00 HEALTHCARE MAINTENANCE: ICD-10-CM

## 2017-09-15 DIAGNOSIS — I25.10 MULTIPLE VESSEL CORONARY ARTERY DISEASE: Primary | ICD-10-CM

## 2017-09-15 DIAGNOSIS — R06.02 SHORTNESS OF BREATH: ICD-10-CM

## 2017-09-15 PROCEDURE — 99213 OFFICE O/P EST LOW 20 MIN: CPT | Performed by: NURSE PRACTITIONER

## 2017-09-15 NOTE — PROGRESS NOTES
Subjective   Cici Winter is a 89 y.o. female     Chief Complaint   Patient presents with   • Follow-up     3 month    • Coronary Artery Disease   • Shortness of Breath     smoothering       HPI    Problem List:    1. Coronary artery disease with history of bypass, remote in 97 by Dr. Hayden.  1.1 Recent catheterization, April 2014 with angioplasty and stent to SVG of left circumflex and drug-eluting stent to SVG of RCA and posterolateral branch by Dr. Siu at [facility].  2. Ischemic cardiomyopathy, EF of 30-35% by echo, March 2014.  1.2 Stress test 9/16/15 - mild lateral and posterolateral ischemia   2.1 Follow-up echocardiogram, April 2014 indicated EF of 55-60%.  2.2 Echo 9/16/15 - Mild LVH; EF > 65%; DD I; mild AR, MR and TR; Mod MS PA 35-40  2.3 Echo 2/13/17 - EF 60%; mild MR and TR; PA 49; trivial PI  3. Hypertension  4. Dyslipidemia  5. Preserved systolic function  6. CKD IV - Dr. Ortiz     Patient is a 89-year-old female who presents today for follow-up with her daughter her side.  He denies any chest pain, pressure, palpitations, fluttering, dizziness, presyncope, syncope, orthopnea or PND.  She does still get some leg swelling and she will alternate her Lasix as needed.  However today her legs with very good.  She says that here for the last few weeks she's had some smothering however it's not when she's lying down it after she gets up in the morning and starts getting active.  She says when she lays down at bed at night she sleeps very well with no problems whatsoever.  Patient's last pulmonary pressure was 49.  Patient used to smoke but has been quit for many years.  PCP monitors her cholesterol.    Current Outpatient Prescriptions   Medication Sig Dispense Refill   • aspirin 81 MG tablet Take  by mouth daily.     • carvedilol (COREG) 6.25 MG tablet TAKE 6.25 MG in PM and 12.5 mg in PM. (Patient taking differently: 6.25 mg 2 (Two) Times a Day With Meals.) 45 tablet 5   • CloNIDine  (CATAPRES-TTS) 0.1 MG/24HR patch Place 1 patch on the skin 1 (One) Time Per Week. 5 patch 6   • furosemide (LASIX) 40 MG tablet Take 40 mg by mouth Daily. Will take extra tablet = 80 mg PRN     • isosorbide dinitrate (ISORDIL) 10 MG tablet Take  by mouth every 8 (eight) hours.     • KRILL OIL PO Take  by mouth daily.     • methimazole (TAPAZOLE) 5 MG tablet Take 0.5 tablets by mouth daily.     • minoxidil (LONITEN) 10 MG tablet TAKE 1 TABLET ONCE DAILY 30 tablet 5   • nitroglycerin (NITROSTAT) 0.4 MG SL tablet 1 under the tongue as needed for angina, may repeat q5mins for up three doses 30 tablet 3   • potassium chloride (MICRO-K) 10 MEQ CR capsule Take 10 mEq by mouth Daily As Needed (with lasix).     • terazosin (HYTRIN) 2 MG capsule Take 1 capsule by mouth Every Night. 30 capsule 5   • vitamin D (ERGOCALCIFEROL) 67614 UNITS capsule capsule Take  by mouth 1 (one) time per week.       Current Facility-Administered Medications   Medication Dose Route Frequency Provider Last Rate Last Dose   • CloNIDine (CATAPRES) tablet 0.1 mg  0.1 mg Oral Once DEBBY Woody           ALLERGIES    Promethazine    Past Medical History:   Diagnosis Date   • Carotid bruit    • Coronary artery disease    • Hyperlipidemia    • Hypertension    • Ischemic cardiomyopathy    • Leg pain     with exercise (leg claudication)   • PVD (peripheral vascular disease)    • Sinus bradycardia    • SOB (shortness of breath)        Social History     Social History   • Marital status:      Spouse name: N/A   • Number of children: N/A   • Years of education: N/A     Occupational History   • Not on file.     Social History Main Topics   • Smoking status: Former Smoker     Types: Cigarettes     Quit date: 6/29/1976   • Smokeless tobacco: Never Used   • Alcohol use No   • Drug use: No   • Sexual activity: Not on file     Other Topics Concern   • Not on file     Social History Narrative       Family History   Problem Relation Age of Onset   •  "Diabetes Mother    • Other Brother      CABG   • Hypertension Brother        Review of Systems   Constitutional: Positive for fatigue. Negative for chills, diaphoresis and fever.   HENT: Positive for sneezing. Negative for rhinorrhea.    Eyes: Negative for visual disturbance.   Respiratory: Positive for shortness of breath (smothering when up active, not when lying down ). Negative for cough, chest tightness, wheezing and stridor.    Cardiovascular: Positive for leg swelling (on lasix 40 daily an 80 mg PRN). Negative for chest pain and palpitations.   Gastrointestinal: Negative for abdominal pain, blood in stool (no melena, no hematuria, no hematemesis, no hematochezia), constipation, diarrhea, nausea and vomiting.   Endocrine: Negative.  Negative for cold intolerance and heat intolerance.   Genitourinary: Positive for frequency.   Musculoskeletal: Positive for myalgias (occasionally, nocturnal leg cramps). Negative for neck pain.   Allergic/Immunologic: Positive for environmental allergies.   Neurological: Negative for dizziness, tremors, seizures, syncope, facial asymmetry (no stroke like symptoms), speech difficulty, weakness, light-headedness, numbness and headaches.   Hematological: Negative.    Psychiatric/Behavioral: Negative.        Objective   /59 (BP Location: Left arm, Patient Position: Sitting)  Pulse 63  Ht 64\" (162.6 cm)  Wt 131 lb 6.4 oz (59.6 kg)  SpO2 96%  BMI 22.55 kg/m2  Lab Results (most recent)     None        Physical Exam   Constitutional: She is oriented to person, place, and time. Vital signs are normal. She appears well-developed and well-nourished. She is active and cooperative.   HENT:   Head: Normocephalic.   Eyes: Lids are normal.   Neck: Normal carotid pulses, no hepatojugular reflux and no JVD present. Carotid bruit is not present.   Cardiovascular: Normal rate, regular rhythm and normal heart sounds.    Pulses:       Radial pulses are 2+ on the right side, and 2+ on the " left side.        Dorsalis pedis pulses are 2+ on the right side, and 2+ on the left side.        Posterior tibial pulses are 2+ on the right side, and 2+ on the left side.   No edema BLE.    Pulmonary/Chest: Effort normal and breath sounds normal.   Abdominal: Normal appearance and bowel sounds are normal.   Neurological: She is alert and oriented to person, place, and time.   Skin: Skin is warm, dry and intact.   Psychiatric: She has a normal mood and affect. Her speech is normal and behavior is normal. Judgment and thought content normal. Cognition and memory are normal.         Assessment/Plan      Diagnosis Plan   1. Multiple vessel coronary artery disease     2. Sinus bradycardia     3. Essential hypertension     4. Dyslipidemia     5. Healthcare maintenance  proBNP    D-dimer, Quantitative   6. Shortness of breath  proBNP    D-dimer, Quantitative       Return in about 3 months (around 12/15/2017).    CAD-patient is on aspirin and beta.  Bradycardia-patient doing well.  Hypertension-patient is been doing much better on the regimen that we have her on currently.  Dyslipidemia-patient is diet controlled.  Shortness of breath-we'll get a d-dimer and BNP.  She will continue her medication regimen.  She will follow-up in 3 months or sooner if any changes.        BNP came back at [address]17, not sure how much of this is from her kidneys as she has had 3 lbs weight loss, no edema and lungs are clear.  I will order a CXR to just be sure.  She sees Dr. Ortiz on Monday so I will fax the BNP to him as well.  I do not have a recent Cr.  She will take extra lasix tomorrow as she took extra yesterday.     Called and spoke with patient and her daughter and advised them of the above.     Answers for HPI/ROS submitted by the patient on 9/9/2017   Hypertension  Chronicity: recurrent  Onset: more than 1 year ago  Progression since onset: waxing and waning  anxiety: No  blurred vision: No  malaise/fatigue: Yes  orthopnea:  Yes  peripheral edema: Yes  PND: No  sweats: No  Agents associated with hypertension: no associated agents  CAD risks: diabetes mellitus, family history, stress  Compliance problems: no compliance problems

## 2017-09-15 NOTE — PATIENT INSTRUCTIONS
Shortness of Breath  Shortness of breath means you have trouble breathing. It could also mean that you have a medical problem. You should get immediate medical care for shortness of breath.  CAUSES   · Not enough oxygen in the air such as with high altitudes or a smoke-filled room.  · Certain lung diseases, infections, or problems.  · Heart disease or conditions, such as angina or heart failure.  · Low red blood cells (anemia).  · Poor physical fitness, which can cause shortness of breath when you exercise.  · Chest or back injuries or stiffness.  · Being overweight.  · Smoking.  · Anxiety, which can make you feel like you are not getting enough air.  DIAGNOSIS   Serious medical problems can often be found during your physical exam. Tests may also be done to determine why you are having shortness of breath. Tests may include:  · Chest X-rays.  · Lung function tests.  · Blood tests.  · An electrocardiogram (ECG).  · An ambulatory electrocardiogram. An ambulatory ECG records your heartbeat patterns over a 24-hour period.  · Exercise testing.  · A transthoracic echocardiogram (TTE). During echocardiography, sound waves are used to evaluate how blood flows through your heart.  · A transesophageal echocardiogram (DEVIN).  · Imaging scans.  Your health care provider may not be able to find a cause for your shortness of breath after your exam. In this case, it is important to have a follow-up exam with your health care provider as directed.   TREATMENT   Treatment for shortness of breath depends on the cause of your symptoms and can vary greatly.  HOME CARE INSTRUCTIONS   · Do not smoke. Smoking is a common cause of shortness of breath. If you smoke, ask for help to quit.  · Avoid being around chemicals or things that may bother your breathing, such as paint fumes and dust.  · Rest as needed. Slowly resume your usual activities.  · If medicines were prescribed, take them as directed for the full length of time directed. This  includes oxygen and any inhaled medicines.  · Keep all follow-up appointments as directed by your health care provider.  SEEK MEDICAL CARE IF:   · Your condition does not improve in the time expected.  · You have a hard time doing your normal activities even with rest.  · You have any new symptoms.  SEEK IMMEDIATE MEDICAL CARE IF:   · Your shortness of breath gets worse.  · You feel light-headed, faint, or develop a cough not controlled with medicines.  · You start coughing up blood.  · You have pain with breathing.  · You have chest pain or pain in your arms, shoulders, or abdomen.  · You have a fever.  · You are unable to walk up stairs or exercise the way you normally do.  MAKE SURE YOU:  · Understand these instructions.  · Will watch your condition.  · Will get help right away if you are not doing well or get worse.     This information is not intended to replace advice given to you by your health care provider. Make sure you discuss any questions you have with your health care provider.     Document Released: 09/12/2002 Document Revised: 12/23/2014 Document Reviewed: 03/04/2013  [employer] Interactive Patient Education ©2017 [employer]  Edema  Edema is an abnormal buildup of fluids in your body tissues. Edema is somewhat dependent on gravity to pull the fluid to the lowest place in your body. That makes the condition more common in the legs and thighs (lower extremities). Painless swelling of the feet and ankles is common and becomes more likely as you get older. It is also common in looser tissues, like around your eyes.   When the affected area is squeezed, the fluid may move out of that spot and leave a dent for a few moments. This dent is called pitting.   CAUSES   There are many possible causes of edema. Eating too much salt and being on your feet or sitting for a long time can cause edema in your legs and ankles. Hot weather may make edema worse. Common medical causes of edema include:  · Heart  failure.  · Liver disease.  · Kidney disease.  · Weak blood vessels in your legs.  · Cancer.  · An injury.  · Pregnancy.  · Some medications.  · Obesity.   SYMPTOMS   Edema is usually painless. Your skin may look swollen or shiny.   DIAGNOSIS   Your health care provider may be able to diagnose edema by asking about your medical history and doing a physical exam. You may need to have tests such as X-rays, an electrocardiogram, or blood tests to check for medical conditions that may cause edema.   TREATMENT   Edema treatment depends on the cause. If you have heart, liver, or kidney disease, you need the treatment appropriate for these conditions. General treatment may include:  · Elevation of the affected body part above the level of your heart.  · Compression of the affected body part. Pressure from elastic bandages or support stockings squeezes the tissues and forces fluid back into the blood vessels. This keeps fluid from entering the tissues.  · Restriction of fluid and salt intake.  · Use of a water pill (diuretic). These medications are appropriate only for some types of edema. They pull fluid out of your body and make you urinate more often. This gets rid of fluid and reduces swelling, but diuretics can have side effects. Only use diuretics as directed by your health care provider.  HOME CARE INSTRUCTIONS   · Keep the affected body part above the level of your heart when you are lying down.    · Do not sit still or stand for prolonged periods.    · Do not put anything directly under your knees when lying down.  · Do not wear constricting clothing or garters on your upper legs.    · Exercise your legs to work the fluid back into your blood vessels. This may help the swelling go down.    · Wear elastic bandages or support stockings to reduce ankle swelling as directed by your health care provider.    · Eat a low-salt diet to reduce fluid if your health care provider recommends it.    · Only take medicines as  directed by your health care provider.   SEEK MEDICAL CARE IF:   · Your edema is not responding to treatment.  · You have heart, liver, or kidney disease and notice symptoms of edema.  · You have edema in your legs that does not improve after elevating them.    · You have sudden and unexplained weight gain.  SEEK IMMEDIATE MEDICAL CARE IF:   · You develop shortness of breath or chest pain.    · You cannot breathe when you lie down.  · You develop pain, redness, or warmth in the swollen areas.    · You have heart, liver, or kidney disease and suddenly get edema.  · You have a fever and your symptoms suddenly get worse.  MAKE SURE YOU:   · Understand these instructions.  · Will watch your condition.  · Will get help right away if you are not doing well or get worse.     This information is not intended to replace advice given to you by your health care provider. Make sure you discuss any questions you have with your health care provider.     Document Released: 12/18/2006 Document Revised: 04/10/2017 Document Reviewed: 10/10/2014  [employer] Interactive Patient Education ©2017 [employer]

## 2017-09-18 ENCOUNTER — TELEPHONE (OUTPATIENT)
Dept: CARDIOLOGY | Facility: CLINIC | Age: 82
End: 2017-09-18

## 2017-09-18 DIAGNOSIS — R07.9 CHEST PAIN, UNSPECIFIED TYPE: Primary | ICD-10-CM

## 2017-09-18 DIAGNOSIS — R06.02 SHORTNESS OF BREATH: ICD-10-CM

## 2017-09-18 NOTE — TELEPHONE ENCOUNTER
Results from recent D-Dimer reviewed per DEBBY De Leon./ Orders received for VQ Scan. Patient aware of results and that LUCIA Goddard will schedule testing and call with appointment.

## 2017-09-20 ENCOUNTER — TELEPHONE (OUTPATIENT)
Dept: CARDIOLOGY | Facility: CLINIC | Age: 82
End: 2017-09-20

## 2017-09-25 ENCOUNTER — TELEPHONE (OUTPATIENT)
Dept: CARDIOLOGY | Facility: CLINIC | Age: 82
End: 2017-09-25

## 2017-09-25 DIAGNOSIS — I65.29 STENOSIS OF CAROTID ARTERY, UNSPECIFIED LATERALITY: Primary | ICD-10-CM

## 2017-09-25 DIAGNOSIS — R09.89 BILATERAL CAROTID BRUITS: ICD-10-CM

## 2017-09-25 NOTE — TELEPHONE ENCOUNTER
Daughter, Linh, states she doesn't see Dr. Ortiz until 10-3-17 and that if she does anything much she still vivinaa, but at rest she's ok. Patient states she is taking 80 mg of Lasix in am and 60 mg in evening if she needs it and she has been taking them this way. Pattie Hess, NP aware, no new orders. PH,LPN

## 2017-09-25 NOTE — TELEPHONE ENCOUNTER
----- Message from DEBBY Woody sent at 9/25/2017  2:34 PM EDT -----  Advise patient of CXR and order carotid artery ultrasound to be done here.  Thanks!  ----- Message -----     From: Sierra Hooper     Sent: 9/25/2017   2:17 PM       To: DEBBY Woody

## 2017-09-25 NOTE — TELEPHONE ENCOUNTER
----- Message from DEBBY Woody sent at 9/25/2017  2:33 PM EDT -----  Please advise patient of results.  Also see how she is doing with breathing and if Dr. Ortiz changed her diuretic any.   ----- Message -----     From: Sierra Hooper     Sent: 9/25/2017   2:19 PM       To: DEBBY Woody

## 2017-09-25 NOTE — TELEPHONE ENCOUNTER
Patient and daughter both aware of CXR results and that Pattie Hess NP wants to schedule a carotid u/s here at the office DX: bilat. Carotid bruits, CRISTOPHER. Patient and daughter ok with this. PH,JIMN

## 2017-09-26 DIAGNOSIS — I10 ESSENTIAL HYPERTENSION: ICD-10-CM

## 2017-09-26 RX ORDER — ERGOCALCIFEROL 1.25 MG/1
CAPSULE ORAL
Qty: 12 CAPSULE | Refills: 2 | Status: SHIPPED | OUTPATIENT
Start: 2017-09-26 | End: 2017-10-02 | Stop reason: SDUPTHER

## 2017-10-02 DIAGNOSIS — I10 ESSENTIAL HYPERTENSION: ICD-10-CM

## 2017-10-02 RX ORDER — ERGOCALCIFEROL 1.25 MG/1
CAPSULE ORAL
Qty: 12 CAPSULE | Refills: 2 | Status: SHIPPED | OUTPATIENT
Start: 2017-10-02

## 2017-10-10 DIAGNOSIS — I10 ESSENTIAL HYPERTENSION: ICD-10-CM

## 2017-10-10 RX ORDER — CARVEDILOL 6.25 MG/1
TABLET ORAL
Qty: 60 TABLET | Refills: 5 | Status: SHIPPED | OUTPATIENT
Start: 2017-10-10 | End: 2017-12-13 | Stop reason: SDUPTHER

## 2017-12-13 ENCOUNTER — OFFICE VISIT (OUTPATIENT)
Dept: CARDIOLOGY | Facility: CLINIC | Age: 82
End: 2017-12-13

## 2017-12-13 VITALS
SYSTOLIC BLOOD PRESSURE: 128 MMHG | HEART RATE: 57 BPM | OXYGEN SATURATION: 94 % | DIASTOLIC BLOOD PRESSURE: 48 MMHG | HEIGHT: 64 IN | BODY MASS INDEX: 22.4 KG/M2 | WEIGHT: 131.2 LBS

## 2017-12-13 DIAGNOSIS — E78.5 DYSLIPIDEMIA: ICD-10-CM

## 2017-12-13 DIAGNOSIS — I25.810 CORONARY ARTERY DISEASE INVOLVING CORONARY BYPASS GRAFT OF NATIVE HEART WITHOUT ANGINA PECTORIS: ICD-10-CM

## 2017-12-13 DIAGNOSIS — I10 ESSENTIAL HYPERTENSION: Primary | ICD-10-CM

## 2017-12-13 PROCEDURE — 99213 OFFICE O/P EST LOW 20 MIN: CPT | Performed by: NURSE PRACTITIONER

## 2017-12-13 RX ORDER — FUROSEMIDE 80 MG
80 TABLET ORAL 2 TIMES DAILY PRN
Refills: 5 | COMMUNITY
Start: 2017-12-01

## 2017-12-13 RX ORDER — CARVEDILOL 6.25 MG/1
6.25 TABLET ORAL 2 TIMES DAILY WITH MEALS
Qty: 180 TABLET | Refills: 3 | Status: SHIPPED | OUTPATIENT
Start: 2017-12-13

## 2017-12-13 NOTE — PROGRESS NOTES
Subjective   Cici Winter is a 89 y.o. female     Chief Complaint   Patient presents with   • Shortness of Breath     presents as a follow up       HPI    Problem List:    1. Coronary artery disease with history of bypass, remote in 97 by Dr. Hayden.  1.1 Recent catheterization, April 2014 with angioplasty and stent to SVG of left circumflex and drug-eluting stent to SVG of RCA and posterolateral branch by Dr. Siu at [facility].  2. Ischemic cardiomyopathy, EF of 30-35% by echo, March 2014.  1.2 Stress test 9/16/15 - mild lateral and posterolateral ischemia   2.1 Follow-up echocardiogram, April 2014 indicated EF of 55-60%.  2.2 Echo 9/16/15 - Mild LVH; EF > 65%; DD I; mild AR, MR and TR; Mod RI PA 35-40  2.3 Echo 2/13/17 - EF 60%; mild MR and TR; PA 49; trivial PI  3. Hypertension  4. Dyslipidemia  5. Preserved systolic function  6. CKD IV - Dr. Ortiz   7. Emphysema    Patient is a 89-year-old female who presents today for follow-up with her daughter her side.  She denies any chest pain, pressure, palpitations, fluttering, dizziness, presyncope, syncope, orthopnea, PND or edema.  She says she's pretty much short of breath with any activity that she does.  She says she can lay down for the most part and sleep with no problems.  Patient did have chest x-ray that showed she had emphysema.  It also showed that she had increased plaque in her carotid arteries however patient does not want to do anything further at this time regarding this.  She says that even if it was called she would not have surgery.  I did advise her the risk of this would be possible stroke and she understands.    Current Outpatient Prescriptions   Medication Sig Dispense Refill   • aspirin 81 MG tablet Take  by mouth daily.     • carvedilol (COREG) 6.25 MG tablet Take 1 tablet by mouth 2 (Two) Times a Day With Meals. 180 tablet 3   • CloNIDine (CATAPRES-TTS) 0.1 MG/24HR patch PLACE 1 PATCH ON THE SKIN ONCE PER WEEK 4 patch 6   •  furosemide (LASIX) 80 MG tablet Take 80 mg by mouth 2 (Two) Times a Day As Needed.  5   • isosorbide dinitrate (ISORDIL) 10 MG tablet Take  by mouth every 8 (eight) hours.     • KRILL OIL PO Take  by mouth daily.     • methimazole (TAPAZOLE) 5 MG tablet Take 0.5 tablets by mouth daily.     • minoxidil (LONITEN) 10 MG tablet TAKE 1 TABLET ONCE DAILY 30 tablet 5   • nitroglycerin (NITROSTAT) 0.4 MG SL tablet 1 under the tongue as needed for angina, may repeat q5mins for up three doses 30 tablet 3   • potassium chloride (MICRO-K) 10 MEQ CR capsule Take 10 mEq by mouth Daily As Needed (with lasix).     • terazosin (HYTRIN) 2 MG capsule Take 1 capsule by mouth Every Night. 30 capsule 5   • vitamin D (ERGOCALCIFEROL) 71596 units capsule capsule TAKE 1 CAPSULE WEEKLY. 12 capsule 2     Current Facility-Administered Medications   Medication Dose Route Frequency Provider Last Rate Last Dose   • CloNIDine (CATAPRES) tablet 0.1 mg  0.1 mg Oral Once DEBBY Woody    Promethazine    Past Medical History:   Diagnosis Date   • Carotid bruit    • Coronary artery disease    • Hyperlipidemia    • Hypertension    • Ischemic cardiomyopathy    • Leg pain     with exercise (leg claudication)   • PVD (peripheral vascular disease)    • Sinus bradycardia    • SOB (shortness of breath)        Social History     Social History   • Marital status:      Spouse name: N/A   • Number of children: N/A   • Years of education: N/A     Occupational History   • Not on file.     Social History Main Topics   • Smoking status: Former Smoker     Types: Cigarettes     Quit date: 6/29/1976   • Smokeless tobacco: Never Used   • Alcohol use No   • Drug use: No   • Sexual activity: Not on file     Other Topics Concern   • Not on file     Social History Narrative       Family History   Problem Relation Age of Onset   • Diabetes Mother    • Other Brother      CABG   • Hypertension Brother        Review of Systems   Constitutional:  "Positive for fatigue. Negative for diaphoresis.   HENT: Positive for sneezing. Negative for rhinorrhea and sinus pressure.    Eyes: Negative for visual disturbance.   Respiratory: Positive for shortness of breath (with any activity; can lay down with no problem). Negative for chest tightness.    Cardiovascular: Negative for chest pain, palpitations and leg swelling.   Gastrointestinal: Positive for diarrhea (had stomach bug ), nausea (had stomach bug ) and vomiting (had stomach bug ). Negative for constipation.   Endocrine: Negative.    Genitourinary: Positive for frequency. Negative for difficulty urinating.   Musculoskeletal: Negative for arthralgias, back pain, myalgias and neck pain.   Skin: Negative.    Allergic/Immunologic: Positive for environmental allergies.   Neurological: Negative for dizziness, syncope, light-headedness and headaches.   Hematological: Does not bruise/bleed easily.   Psychiatric/Behavioral: The patient is nervous/anxious.        Objective   /48 (BP Location: Left arm, Patient Position: Sitting)  Pulse 57  Ht 162.6 cm (64\")  Wt 59.5 kg (131 lb 3.2 oz)  SpO2 94%  BMI 22.52 kg/m2  Vitals:    12/13/17 0848   BP: 128/48   BP Location: Left arm   Patient Position: Sitting   Pulse: 57   SpO2: 94%   Weight: 59.5 kg (131 lb 3.2 oz)   Height: 162.6 cm (64\")      Lab Results (most recent)     None        Physical Exam   Constitutional: She is oriented to person, place, and time. Vital signs are normal. She appears well-developed and well-nourished. She is active and cooperative.   HENT:   Head: Normocephalic.   Eyes: Lids are normal.   Neck: Normal carotid pulses, no hepatojugular reflux and no JVD present. Carotid bruit is not present.   Cardiovascular: Regular rhythm and normal heart sounds.  Bradycardia present.    Pulses:       Radial pulses are 2+ on the right side, and 2+ on the left side.        Dorsalis pedis pulses are 2+ on the right side, and 2+ on the left side.        " Posterior tibial pulses are 2+ on the right side, and 2+ on the left side.   No edema BLE.    Pulmonary/Chest: Effort normal and breath sounds normal.   Abdominal: Normal appearance and bowel sounds are normal.   Neurological: She is alert and oriented to person, place, and time.   Skin: Skin is warm, dry and intact.   Psychiatric: She has a normal mood and affect. Her speech is normal and behavior is normal. Judgment and thought content normal. Cognition and memory are normal.       Procedure   Procedures         Assessment/Plan      Diagnosis Plan   1. Essential hypertension  carvedilol (COREG) 6.25 MG tablet   2. Dyslipidemia     3. Coronary artery disease involving coronary bypass graft of native heart without angina pectoris         Return in about 6 months (around 6/13/2018).       hypertension-patient doing very well.  Dyslipidemia-patient is diet controlled.  CAD-patient is on aspirin and beta.  She will continue her medication regimen.  She will follow-up in 6 months or sooner if any changes.    Electronically signed by:        Answers for HPI/ROS submitted by the patient on 12/6/2017   Hypertension  Chronicity: chronic  Onset: more than 1 year ago  Progression since onset: gradually improving  Condition status: controlled  Agents associated with hypertension: thyroid hormones  CAD risks: diabetes mellitus, family history, stress  Compliance problems: no compliance problems

## 2017-12-13 NOTE — PATIENT INSTRUCTIONS
Coronary Artery Disease, Female  Coronary artery disease (CAD) is a process in which the blood vessels of the heart (coronary arteries) become narrow or blocked. The narrowing or blockage can lead to decreased blood flow to the heart muscle (angina). Symptoms known as angina can develop if the blood flow is reduced to the heart for a short period of time. Prolonged reduced blood flow can cause a heart attack (myocardial infarction, MI).  CAD is a leading cause of death for women. More women die from CAD than from cancer, lung disease, and accidents combined. It is important for women to understand the risks, symptoms, and treatment options for CAD.  CAUSES  Atherosclerosis is the cause of CAD. Atherosclerosis is the buildup of fat and cholesterol (plaque) on the inside of the arteries. Over time, the plaque may narrow or block the artery, and this will lessen blood flow to the heart. Plaque can also become weak and break off within a coronary artery to form a clot and cause a sudden blockage.  RISK FACTORS  Many risk factors increase your chances of getting CAD, including:  · High cholesterol levels.  · High blood pressure (hypertension).  · Tobacco use.  · Diabetes.  · Age. Women over age 55 are at a greater risk of CAD.  · Menopause.    All postmenopausal women are at greater risk of CAD.    Women who have experienced menopause between the ages of 40-45 (early menopause) are at a higher risk of CAD.    Women who have experienced menopause before age 40 (premature menopause) are at an extremely high risk of CAD.  · Family history of CAD.  · Obesity.  · Lack of exercise.  · A diet high in saturated fats.  SYMPTOMS   Many people do not experience any symptoms during the early stages of CAD. As the condition progresses, symptoms may include:  · Chest pain.    The pain can be described as crushing or squeezing, or a tightness, pressure, fullness, or heaviness in the chest.    The pain can last more than a few minutes  or can stop and recur.  · Pain in the arms, neck, jaw, or back.  · Unexplained heartburn or indigestion.  · Shortness of breath.  · Nausea.  · Sudden cold sweats.  · Sudden light-headedness.  Many women have chest discomfort and the other symptoms. However, women often have different (atypical) symptoms, such as:  · Fatigue.  · Unexplained feelings of nervousness or anxiety.  · Unexplained weakness.  · Dizziness or fainting.  Sometimes, women may not have any symptoms of CAD.  DIAGNOSIS   Tests to diagnose CAD may include:  · ECG (electrocardiogram).  · Exercise stress test. This looks for signs of blockage when the heart is being exercised.  · Pharmacologic stress test. This test looks for signs of blockage when the heart is being stressed with a medicine.  · Blood tests.  · Coronary angiogram. This is a procedure to look at the coronary arteries to see if there is any blockage.  TREATMENT  The treatment of CAD may include the following:  · Healthy behavioral changes to reduce or control risk factors.  · Medicine.  · Coronary stenting. A stent helps to keep an artery open.  · Coronary angioplasty. This procedure widens a narrowed or blocked artery.  · Coronary artery bypass surgery. This will allow your blood to pass the blockage (bypass) to reach your heart.  HOME CARE INSTRUCTIONS  · Take medicines only as directed by your health care provider.  · Do not take the following medicines unless your health care provider approves:    Nonsteroidal anti-inflammatory drugs (NSAIDs), such as ibuprofen, naproxen, or celecoxib.    Vitamin supplements that contain vitamin A, vitamin E, or both.    Hormone replacement therapy that contains estrogen with or without progestin.  · Manage other health conditions such as hypertension and diabetes as directed by your health care provider.  · Follow a heart-healthy diet. A dietitian can help to educate you about healthy food options and changes.  · Use healthy cooking methods such as  roasting, grilling, broiling, baking, poaching, steaming, or stir-frying. Talk to a dietitian to learn more about healthy cooking methods.  · Follow an exercise program approved by your health care provider.  · Maintain a healthy weight. Lose weight as approved by your health care provider.  · Plan rest periods when fatigued.  · Learn to manage stress.  · Do not use any tobacco products, including cigarettes, chewing tobacco, or electronic cigarettes. If you need help quitting, ask your health care provider.  · If you drink alcohol, and your health care provider approves, limit your alcohol intake to no more than 1 drink per day. One drink equals 12 ounces of beer, 5 ounces of wine, or 1½ ounces of hard liquor.  · Stop illegal drug use.  · Your health care provider may ask you to monitor your blood pressure. A blood pressure reading consists of a higher number over a lower number, such as 110 over 72, which is written as 110/72. Ideally, your blood pressure should be:    Below 140/90 if you have no other medical conditions.    Below 130/80 if you have diabetes or kidney disease.  · Keep all follow-up visits as directed by your health care provider. This is important.  SEEK IMMEDIATE MEDICAL CARE IF:  · You have pain in your chest, neck, arm, jaw, stomach, or back that lasts more than a few minutes, is recurring, or is unrelieved by taking medicine under your tongue (sublingual nitroglycerin).  · You have profuse sweating without cause.  · You have unexplained:    Heartburn or indigestion.    Shortness of breath or difficulty breathing.    Nausea or vomiting.    Fatigue.    Feelings of nervousness or anxiety.    Weakness.    Diarrhea.  · You have sudden light-headedness or dizziness.  · You faint.  These symptoms may represent a serious problem that is an emergency. Do not wait to see if the symptoms will go away. Get medical help right away. Call your local emergency services (911 in the [place] Do not drive yourself  to the hospital.     This information is not intended to replace advice given to you by your health care provider. Make sure you discuss any questions you have with your health care provider.     Document Released: 03/11/2013 Document Revised: 01/08/2016 Document Reviewed: 04/21/2015  [employer]vier Interactive Patient Education ©2017 [employer]

## 2018-01-23 RX ORDER — MINOXIDIL 10 MG/1
TABLET ORAL
Qty: 30 TABLET | Refills: 5 | Status: SHIPPED | OUTPATIENT
Start: 2018-01-23

## 2018-02-21 DIAGNOSIS — I10 ESSENTIAL HYPERTENSION: ICD-10-CM

## 2018-02-21 RX ORDER — TERAZOSIN 2 MG/1
CAPSULE ORAL
Qty: 30 CAPSULE | Refills: 5 | Status: SHIPPED | OUTPATIENT
Start: 2018-02-21

## 2018-06-11 ENCOUNTER — TELEPHONE (OUTPATIENT)
Dept: CARDIOLOGY | Facility: CLINIC | Age: 83
End: 2018-06-11

## 2018-06-11 NOTE — TELEPHONE ENCOUNTER
----- Message from [facility] Rep sent at 6/11/2018 11:54 AM EDT -----  Contact: [phone]  EDENILSON PARRISH (DAUGHTER) CALLED PT IS NOW IN THE CARE OF HOSPICE. SHE WANTS TO KNOW IF [facility] WANTS HER TO KEEP HER APPOINTMENT OR LET HOSPICE TAKE CARE OF HER. KIDNEY DR HAS STOPPED SEEING HER.     Spoke with patient's daughter, Edenilson and informed her that our office leaves that at the discretion of the family if they want the patient to be seen. She states that she will speak with the Hospice nurse at her next visit and see if her insurance will pay for her visit or not since she is now under the care of Hospice. I told her that would be fine. She states if insurance will cover the cost she would like for the patient to be seen.

## 2018-10-19 RX ORDER — ERGOCALCIFEROL 1.25 MG/1
CAPSULE ORAL
Qty: 12 CAPSULE | Refills: 11 | OUTPATIENT
Start: 2018-10-19

## 2018-10-22 RX ORDER — ERGOCALCIFEROL 1.25 MG/1
CAPSULE ORAL
Qty: 12 CAPSULE | Refills: 2 | OUTPATIENT
Start: 2018-10-22

## 2018-11-19 RX ORDER — ERGOCALCIFEROL 1.25 MG/1
CAPSULE ORAL
Qty: 12 CAPSULE | Refills: 2 | OUTPATIENT
Start: 2018-11-19